# Patient Record
Sex: MALE | Race: WHITE | Employment: OTHER | URBAN - METROPOLITAN AREA
[De-identification: names, ages, dates, MRNs, and addresses within clinical notes are randomized per-mention and may not be internally consistent; named-entity substitution may affect disease eponyms.]

---

## 2023-02-17 PROBLEM — N40.1 BENIGN LOCALIZED PROSTATIC HYPERPLASIA WITH LOWER URINARY TRACT SYMPTOMS (LUTS): Status: ACTIVE | Noted: 2023-02-17

## 2023-02-17 PROBLEM — E78.00 ELEVATED LDL CHOLESTEROL LEVEL: Status: ACTIVE | Noted: 2023-02-17

## 2023-02-17 PROBLEM — E55.9 VITAMIN D INSUFFICIENCY: Status: ACTIVE | Noted: 2023-02-17

## 2023-02-17 PROBLEM — R51.9 HEADACHE: Status: ACTIVE | Noted: 2023-02-17

## 2023-02-17 PROBLEM — N20.0 NEPHROLITHIASIS: Status: ACTIVE | Noted: 2023-02-17

## 2023-02-17 PROBLEM — H93.13 TINNITUS OF BOTH EARS: Status: ACTIVE | Noted: 2023-02-17

## 2023-02-17 PROBLEM — C61 PROSTATE CANCER (MULTI): Status: ACTIVE | Noted: 2023-02-17

## 2023-02-17 RX ORDER — TAMSULOSIN HYDROCHLORIDE 0.4 MG/1
CAPSULE ORAL
COMMUNITY
End: 2023-03-30 | Stop reason: ALTCHOICE

## 2023-02-17 RX ORDER — SILODOSIN 8 MG/1
1 CAPSULE ORAL
COMMUNITY
End: 2024-02-13 | Stop reason: SDUPTHER

## 2023-03-29 NOTE — PROGRESS NOTES
Subjective   Reason for Visit: Sameer Emerson is an 67 y.o. male here for his Subsequent Medicare Assessment          He is considering doing couples counseling.  His wife has a lot of fears. He is very social and is still doing the things he enjoys.  She stays isolated all the time.     He is having issues with seasonal allergies. He is reacting to the pollen  He takes Claritin prn    HEALTH:  PSA was 4.5 in 3/2019, 4/2021 4.76, 3/2022 < 0.10, 1/2023  Colon never and declines   Cologuard normal 10/2022 and Q 3   Ca cardiac score ordered 3/2023  EKG 2009, 3/2022   Urine  Hep B x 3 done  Hep C negative 10/2020  Flu 9/19, 10/2020, 10/2021, 10/2022  TDAP 2015   Prevnar 11/2020  Pneumo 11/2021   Zostavax never   Shingrix recommended and will check local pharmacies   Moderna CVD 1/15/2021 and 2/13/2021 booster   Ophth Sees yearly. He wears reading glasses only. His father had glaucoma.  He has no history of glaucoma or MD.   Copy of his Advanced Directives scanned in his chart 3/2022          Patient Care Team:  Temi Alvarez MD as PCP - [facility]  Temi Alvarez MD as PCP - [employer] PCP     Review of Systems  All systems negative except those listed in the HPI      Objective   Vitals:  Visit Vitals  /58 (BP Location: Left arm, Patient Position: Sitting)   Pulse 58   Temp 36.2 °C (97.1 °F)   Resp 16    Body mass index is 24.82 kg/m².     Physical Exam  Vitals reviewed.   Constitutional:       Appearance: Normal appearance. He is normal weight.   HENT:      Head: Normocephalic.      Right Ear: Tympanic membrane, ear canal and external ear normal.      Left Ear: Tympanic membrane, ear canal and external ear normal.      Ears:      Comments: Debris in ear canals bilaterally      Nose: Nose normal.      Mouth/Throat:      Pharynx: Oropharynx is clear.   Eyes:      Conjunctiva/sclera: Conjunctivae normal.   Cardiovascular:      Rate and Rhythm: Normal rate and regular rhythm.      Pulses: Normal  pulses.      Heart sounds: Normal heart sounds.   Pulmonary:      Effort: Pulmonary effort is normal.      Breath sounds: Normal breath sounds.   Abdominal:      General: Bowel sounds are normal.      Palpations: Abdomen is soft.   Musculoskeletal:         General: Normal range of motion.      Cervical back: Normal range of motion and neck supple.   Skin:     General: Skin is warm.   Neurological:      General: No focal deficit present.      Mental Status: He is alert and oriented to person, place, and time.   Psychiatric:         Mood and Affect: Mood normal.         Behavior: Behavior normal.         Thought Content: Thought content normal.         Judgment: Judgment normal.       Assessment/Plan   Problem List Items Addressed This Visit       Elevated LDL cholesterol level    Relevant Orders    Lipid Panel    TSH with reflex to Free T4 if abnormal    CT cardiac scoring wo IV contrast    Prostate cancer (CMS/HCC) - Primary    Relevant Orders    CBC    Comprehensive Metabolic Panel        Subsequent Medicare Assessment completed   Reviewed his labs from 1/2023  Labs ordered     Medicare Wellness completed  -  Discussed healthy diet and regular exercise.    -  Physical exam overall unremarkable. Immunizations reviewed and updated accordingly. Healthy lifestyle choices discussed (tobacco avoidance, appropriate alcohol use, avoidance of illicit substances).   -  Patient is wearing seatbelt.   -  Screening lab work ordered as indicated.    -  Age appropriate screening tests reviewed with patient.      Recommend he consider getting the Shingrix vaccine - he will check local pharmacies      We spent 15 minutes discussing depression screen and there is nothing found that is of concern for underling depression. The PQH form was filled and the meds reviewed. No depression to report     We spent 15 minutes discussing alcohol use and there are no concerns about overuse. The 15 min was spent in going over any issues of use of  alcohol. No EtOH use    He has grab bars in the shower.  He has not fallen recently and no risk of falls in the house   He has good lighting around the house and functioning smoke detectors.     He has a history of tinnitus.  His has bilateral loss of hearing   He has hearing aids but does not wear them     His weight/ BMI is in normal range in office, recommend he maintain.    BP stable. We will continue to monitor   EKG in 3/24/22 was normal sinus, no LVH or strain pattern noted   Recommend he continue to monitor his BP at home and call with elevated readings   AAA US normal in 4/2022     I have spent 15 min face to face with this patient discussing their cardiac risk and behavioral therapies of nutrition choices and exercise. We are trying to eliminate habits that are contributing to their cardiac risk.  We agreed on a plan of how they can reduce their current CV risk   Since they have CV risk >10 % the advantage of aspirin was discussed and encouraged   The patient's 10 yr CV risk was estimated at 9.2 %. There is no benefit to adding medications at this time 3/2023    Elevated lipids:  and HDL 63 on labs in 3/2022. Labs ordered and we will adjust if indicated  3/2023  Explained goal for LDL to be less than 100 and ideally less than 70   Ca cardiac score ordered today 3/2023    Seasonal allergies:  Recommend using a humidifier in the bedroom at night  Recommend trying OTC Xlear NS BID  Continue Claritin prn and Flonase NS prn allergies. Explained how to use these medications as a prevention     Situational stressors:  He is considering doing couples counseling.  His wife has a lot of fears. He is very social and is still doing the things he enjoys.  She stays isolated all the time. She has a fear of getting long COVID.    He was diagnosed with spontaneous pneumothorax at the age of 19  He had a shingle outbreak approx 20 years ago without recurrence     He has a history of renal stones with lithotripsy in  2009 and good results     Benign localized prostatic hyperplasia with LUTS/ Prostate cancer:  PSA < 0.10  in 1/2023.  He was dx with prostate cancer and underwent radiation and finished the hormone therapy   He saw Blanca Koehler in 9/2022. Continue silodosin 8 mg daily  He saw Heme onc in 12/2022    Vitamin D def:   Continue OTC Vitamin D 5000 UT daily     He has never had a colonoscopy.   Cologuard normal 10/2022 and Q 3     Ophth:  Sees yearly. He wears reading glasses only. His father had glaucoma.  He has no history of glaucoma or MD. He will have her next eye exam faxed to my office in order to update his medical records.     I spent 15 min with the patient discussing their wishes for end of life choices.   We discussed the need for a Living Will and that wishes should be discussed with Family. The DNR status was reviewed, and we discussed the options of this and, the DNR _CC options as well.   We also went over how important it was to have these choices written down and clear for any surviving family so that their wishes are followed   Copy of his Advanced Directives scanned in his chart 3/2022    Hep B x 3 done  Hep C negative 10/2020  Flu 9/19, 10/2020, 10/2021, 10/2022   TDAP 2015   Prevnar 11/2020  Pneumo 11/2021   Zostavax never   Shingrix recommended and will check local pharmacies   Moderna CVD 1/15/2021 and 2/13/2021 booster     RTC in 1 year for MCR or sooner if needed     Scribe Attestation  By signing my name below, ITeri , Scribe   attest that this documentation has been prepared under the direction and in the presence of Temi Alvarez MD.

## 2023-03-30 ENCOUNTER — OFFICE VISIT (OUTPATIENT)
Dept: PRIMARY CARE | Facility: CLINIC | Age: 68
End: 2023-03-30
Payer: MEDICARE

## 2023-03-30 ENCOUNTER — LAB (OUTPATIENT)
Dept: LAB | Facility: LAB | Age: 68
End: 2023-03-30
Payer: MEDICARE

## 2023-03-30 VITALS
RESPIRATION RATE: 16 BRPM | TEMPERATURE: 97.1 F | HEIGHT: 70 IN | HEART RATE: 58 BPM | OXYGEN SATURATION: 98 % | DIASTOLIC BLOOD PRESSURE: 58 MMHG | BODY MASS INDEX: 24.77 KG/M2 | WEIGHT: 173 LBS | SYSTOLIC BLOOD PRESSURE: 100 MMHG

## 2023-03-30 DIAGNOSIS — E78.00 ELEVATED LDL CHOLESTEROL LEVEL: ICD-10-CM

## 2023-03-30 DIAGNOSIS — C61 PROSTATE CANCER (MULTI): ICD-10-CM

## 2023-03-30 DIAGNOSIS — Z00.00 HEALTHCARE MAINTENANCE: Primary | ICD-10-CM

## 2023-03-30 LAB
ALANINE AMINOTRANSFERASE (SGPT) (U/L) IN SER/PLAS: 14 U/L (ref 10–52)
ALBUMIN (G/DL) IN SER/PLAS: 4.1 G/DL (ref 3.4–5)
ALKALINE PHOSPHATASE (U/L) IN SER/PLAS: 85 U/L (ref 33–136)
ANION GAP IN SER/PLAS: 7 MMOL/L (ref 10–20)
ASPARTATE AMINOTRANSFERASE (SGOT) (U/L) IN SER/PLAS: 17 U/L (ref 9–39)
BILIRUBIN TOTAL (MG/DL) IN SER/PLAS: 1.4 MG/DL (ref 0–1.2)
CALCIUM (MG/DL) IN SER/PLAS: 9.1 MG/DL (ref 8.6–10.3)
CARBON DIOXIDE, TOTAL (MMOL/L) IN SER/PLAS: 31 MMOL/L (ref 21–32)
CHLORIDE (MMOL/L) IN SER/PLAS: 103 MMOL/L (ref 98–107)
CHOLESTEROL (MG/DL) IN SER/PLAS: 180 MG/DL (ref 0–199)
CHOLESTEROL IN HDL (MG/DL) IN SER/PLAS: 47.5 MG/DL
CHOLESTEROL/HDL RATIO: 3.8
CREATININE (MG/DL) IN SER/PLAS: 1.02 MG/DL (ref 0.5–1.3)
ERYTHROCYTE DISTRIBUTION WIDTH (RATIO) BY AUTOMATED COUNT: 12.4 % (ref 11.5–14.5)
ERYTHROCYTE MEAN CORPUSCULAR HEMOGLOBIN CONCENTRATION (G/DL) BY AUTOMATED: 32 G/DL (ref 32–36)
ERYTHROCYTE MEAN CORPUSCULAR VOLUME (FL) BY AUTOMATED COUNT: 92 FL (ref 80–100)
ERYTHROCYTES (10*6/UL) IN BLOOD BY AUTOMATED COUNT: 5.12 X10E12/L (ref 4.5–5.9)
GFR MALE: 80 ML/MIN/1.73M2
GLUCOSE (MG/DL) IN SER/PLAS: 93 MG/DL (ref 74–99)
HEMATOCRIT (%) IN BLOOD BY AUTOMATED COUNT: 47.2 % (ref 41–52)
HEMOGLOBIN (G/DL) IN BLOOD: 15.1 G/DL (ref 13.5–17.5)
LDL: 117 MG/DL (ref 0–99)
LEUKOCYTES (10*3/UL) IN BLOOD BY AUTOMATED COUNT: 6.8 X10E9/L (ref 4.4–11.3)
NRBC (PER 100 WBCS) BY AUTOMATED COUNT: 0 /100 WBC (ref 0–0)
PLATELETS (10*3/UL) IN BLOOD AUTOMATED COUNT: 151 X10E9/L (ref 150–450)
POTASSIUM (MMOL/L) IN SER/PLAS: 4.3 MMOL/L (ref 3.5–5.3)
PROTEIN TOTAL: 6.6 G/DL (ref 6.4–8.2)
SODIUM (MMOL/L) IN SER/PLAS: 137 MMOL/L (ref 136–145)
THYROTROPIN (MIU/L) IN SER/PLAS BY DETECTION LIMIT <= 0.05 MIU/L: 1.53 MIU/L (ref 0.44–3.98)
TRIGLYCERIDE (MG/DL) IN SER/PLAS: 76 MG/DL (ref 0–149)
UREA NITROGEN (MG/DL) IN SER/PLAS: 15 MG/DL (ref 6–23)
VLDL: 15 MG/DL (ref 0–40)

## 2023-03-30 PROCEDURE — 80061 LIPID PANEL: CPT

## 2023-03-30 PROCEDURE — 80053 COMPREHEN METABOLIC PANEL: CPT

## 2023-03-30 PROCEDURE — 1170F FXNL STATUS ASSESSED: CPT | Performed by: INTERNAL MEDICINE

## 2023-03-30 PROCEDURE — 85027 COMPLETE CBC AUTOMATED: CPT

## 2023-03-30 PROCEDURE — 36415 COLL VENOUS BLD VENIPUNCTURE: CPT

## 2023-03-30 PROCEDURE — 99213 OFFICE O/P EST LOW 20 MIN: CPT | Performed by: INTERNAL MEDICINE

## 2023-03-30 PROCEDURE — G0442 ANNUAL ALCOHOL SCREEN 15 MIN: HCPCS | Performed by: INTERNAL MEDICINE

## 2023-03-30 PROCEDURE — 1159F MED LIST DOCD IN RCRD: CPT | Performed by: INTERNAL MEDICINE

## 2023-03-30 PROCEDURE — G0444 DEPRESSION SCREEN ANNUAL: HCPCS | Performed by: INTERNAL MEDICINE

## 2023-03-30 PROCEDURE — 1160F RVW MEDS BY RX/DR IN RCRD: CPT | Performed by: INTERNAL MEDICINE

## 2023-03-30 PROCEDURE — G0446 INTENS BEHAVE THER CARDIO DX: HCPCS | Performed by: INTERNAL MEDICINE

## 2023-03-30 PROCEDURE — 1036F TOBACCO NON-USER: CPT | Performed by: INTERNAL MEDICINE

## 2023-03-30 PROCEDURE — 84443 ASSAY THYROID STIM HORMONE: CPT

## 2023-03-30 PROCEDURE — G0439 PPPS, SUBSEQ VISIT: HCPCS | Performed by: INTERNAL MEDICINE

## 2023-03-30 ASSESSMENT — COLUMBIA-SUICIDE SEVERITY RATING SCALE - C-SSRS
1. IN THE PAST MONTH, HAVE YOU WISHED YOU WERE DEAD OR WISHED YOU COULD GO TO SLEEP AND NOT WAKE UP?: NO
6. HAVE YOU EVER DONE ANYTHING, STARTED TO DO ANYTHING, OR PREPARED TO DO ANYTHING TO END YOUR LIFE?: NO
2. HAVE YOU ACTUALLY HAD ANY THOUGHTS OF KILLING YOURSELF?: NO

## 2023-03-30 ASSESSMENT — ACTIVITIES OF DAILY LIVING (ADL)
MANAGING_FINANCES: INDEPENDENT
FEEDING: INDEPENDENT
TOILETING: INDEPENDENT
NEEDS ASSISTANCE WITH FOOD: INDEPENDENT
GROCERY SHOPPING: INDEPENDENT
DOING HOUSEWORK: INDEPENDENT
DOING_HOUSEWORK: INDEPENDENT
USING TELEPHONE: INDEPENDENT
BATHING: INDEPENDENT
BATHING: INDEPENDENT
DRESSING: INDEPENDENT
USING TRANSPORTATION: INDEPENDENT
TAKING_MEDICATION: INDEPENDENT
JUDGMENT_ADEQUATE_SAFELY_COMPLETE_DAILY_ACTIVITIES: YES
STIL DRIVING: YES
DRESSING: INDEPENDENT
DOING_HOUSEWORK: INDEPENDENT
PILL BOX USED: NO
TAKING MEDICATION: INDEPENDENT
ADEQUATE_TO_COMPLETE_ADL: YES
GROCERY_SHOPPING: INDEPENDENT
GROCERY_SHOPPING: INDEPENDENT
PREPARING MEALS: INDEPENDENT
TAKING_MEDICATION: INDEPENDENT
EATING: INDEPENDENT
DRESSING: INDEPENDENT
MANAGING FINANCES: INDEPENDENT
BATHING: INDEPENDENT
MANAGING_FINANCES: INDEPENDENT

## 2023-03-30 ASSESSMENT — PATIENT HEALTH QUESTIONNAIRE - PHQ9
SUM OF ALL RESPONSES TO PHQ9 QUESTIONS 1 AND 2: 0
SUM OF ALL RESPONSES TO PHQ9 QUESTIONS 1 AND 2: 0
1. LITTLE INTEREST OR PLEASURE IN DOING THINGS: NOT AT ALL
SUM OF ALL RESPONSES TO PHQ9 QUESTIONS 1 AND 2: 0
1. LITTLE INTEREST OR PLEASURE IN DOING THINGS: NOT AT ALL
2. FEELING DOWN, DEPRESSED OR HOPELESS: NOT AT ALL
1. LITTLE INTEREST OR PLEASURE IN DOING THINGS: NOT AT ALL
1. LITTLE INTEREST OR PLEASURE IN DOING THINGS: NOT AT ALL
SUM OF ALL RESPONSES TO PHQ9 QUESTIONS 1 AND 2: 0

## 2023-03-30 ASSESSMENT — ENCOUNTER SYMPTOMS
LOSS OF SENSATION IN FEET: 0
DEPRESSION: 0
OCCASIONAL FEELINGS OF UNSTEADINESS: 0

## 2023-03-30 ASSESSMENT — PAIN SCALES - GENERAL: PAINLEVEL: 0-NO PAIN

## 2023-03-30 NOTE — RESULT ENCOUNTER NOTE
The lipids are a little elevated and the cardiac risk is 10 % for a cardiac event in 10 yrs so if we get the LDL <100 the cardiac risk will drop een more   The rest of the labs are good range

## 2023-03-30 NOTE — PATIENT INSTRUCTIONS
It was a pleasure to see you today.  I would like to remind you about importance of a healthy lifestyle in order to improve your well-being and live longer. Try to engage in physical activities for at least 150 minutes per week.  Eat about 10 servings of fruits and vegetables daily. My advice is 2 servings of fruits and 8 servings of vegetables. For vegetables choose at least half of them green and at least half of them fresh.  Please avoid sugar, salt, fried food and saturated fat.  Please follow low carbohydrate diet and daily exercise routine for at least 30 minutes. Nutritional consultation is available, please let me know if you are interested. I will be happy to discuss details with you if interested.   Have a good day and stay well.

## 2023-06-08 LAB — PROSTATE SPECIFIC AG (NG/ML) IN SER/PLAS: <0.1 NG/ML (ref 0–4)

## 2023-06-30 DIAGNOSIS — R91.1 LUNG NODULE, SOLITARY: Primary | ICD-10-CM

## 2023-08-11 DIAGNOSIS — R69 TRAVEL-RELATED ILLNESS: Primary | ICD-10-CM

## 2023-08-12 DIAGNOSIS — R91.8 LUNG NODULES: Primary | ICD-10-CM

## 2023-08-17 DIAGNOSIS — R42 VERTIGO: Primary | ICD-10-CM

## 2023-08-17 RX ORDER — MECLIZINE HYDROCHLORIDE 25 MG/1
25 TABLET ORAL 3 TIMES DAILY PRN
Qty: 30 TABLET | Refills: 1 | Status: SHIPPED | OUTPATIENT
Start: 2023-08-17 | End: 2024-03-17 | Stop reason: ALTCHOICE

## 2023-10-18 ENCOUNTER — CLINICAL SUPPORT (OUTPATIENT)
Dept: PRIMARY CARE | Facility: CLINIC | Age: 68
End: 2023-10-18
Payer: MEDICARE

## 2023-10-18 PROCEDURE — 90662 IIV NO PRSV INCREASED AG IM: CPT | Performed by: INTERNAL MEDICINE

## 2023-10-18 PROCEDURE — G0008 ADMIN INFLUENZA VIRUS VAC: HCPCS | Performed by: INTERNAL MEDICINE

## 2023-10-18 NOTE — PROGRESS NOTES
Patient presents in the office for Flu vaccine.     This drug was administered by Adela Olivares MA at 9:48 AM per physician order.    Patient tolerated well.

## 2023-12-04 ENCOUNTER — TELEPHONE (OUTPATIENT)
Dept: GASTROENTEROLOGY | Facility: HOSPITAL | Age: 68
End: 2023-12-04
Payer: MEDICARE

## 2023-12-12 ENCOUNTER — LAB (OUTPATIENT)
Dept: LAB | Facility: LAB | Age: 68
End: 2023-12-12
Payer: MEDICARE

## 2023-12-12 DIAGNOSIS — C61 MALIGNANT NEOPLASM OF PROSTATE (MULTI): Primary | ICD-10-CM

## 2023-12-12 LAB — PSA SERPL-MCNC: <0.1 NG/ML

## 2023-12-12 PROCEDURE — 84153 ASSAY OF PSA TOTAL: CPT

## 2023-12-12 PROCEDURE — 36415 COLL VENOUS BLD VENIPUNCTURE: CPT

## 2023-12-21 ENCOUNTER — TELEPHONE (OUTPATIENT)
Dept: RADIATION ONCOLOGY | Facility: HOSPITAL | Age: 68
End: 2023-12-21
Payer: MEDICARE

## 2023-12-21 NOTE — PROGRESS NOTES
Cancer synopsis:  Rad/onc: Dr. Guy/ Percy CNP       Interval History:    Referred by Dr. Sameer Caceres.     He had an elevated PSA at 4.76 on 1/9/21, which increased to 5.8 on 6/27/21.      Prostate MRI on 8/7/21 showed:  1.  1.5 cm right posteromedial peripheral zone lesion extending from the base to the mid gland and 1.1 cm left posteromedial peripheral zone lesion in the mid gland the consistent with  PI-RADS 5 and 4  lesions respectively. Mild capsular irregularity associated with both these lesions concerning for extracapsular extension. Abutment of the medial aspect of the right seminal vesicle by the right base  peripheral zone lesion concerning for seminal vesicle invasion.  2. No pelvic lymphadenopathy.     The prostate was estimated to weigh 24.2g.     Prostate biopsy on 8/26/21 showed:  A.  PROSTATE, RIGHT APEX, BIOPSY:    --ADENOCARCINOMA, BRIANNE SCORE 3+4=7 (GRADE GROUP 2), INVOLVING TWO OF TWO  CORES AND 70% OF THE SUBMITTED TISSUE     B.  PROSTATE, RIGHT MID, BIOPSY:    --ADENOCARCINOMA, BRIANNE SCORE 4+3=7 (GRADE GROUP 3), INVOLVING TWO OF TWO  CORES AND 95% OF THE SUBMITTED TISSUE; SEE NOTE.     C.  PROSTATE, RIGHT BASE, BIOPSY:    --ADENOCARCINOMA, BRIANNE SCORE 3+3=6 (GRADE GROUP 1), INVOLVING ONE OF TWO  CORES AND LESS THAN 5% OF THE SUBMITTED TISSUE; SEE NOTE.     D.  PROSTATE, LEFT APEX, BIOPSY:    --ADENOCARCINOMA, BRIANNE SCORE 3+3=6 (GRADE GROUP 1), INVOLVING TWO OF TWO  CORES AND 5% OF THE SUBMITTED TISSUE     E.  PROSTATE, LEFT MID, BIOPSY:    --ADENOCARCINOMA, BRIANNE SCORE 3+3=6 (GRADE GROUP 1), INVOLVING TWO OF TWO  CORES AND 20% OF THE SUBMITTED TISSUE     F.  PROSTATE, LEFT BASE, BIOPSY:    --ADENOCARCINOMA, BRIANNE SCORE 3+3=6 (GRADE GROUP 1), INVOLVING TWO OF TWO  CORES AND 20% OF THE SUBMITTED TISSUE        SBRT completed on 12/17/21.     Completed course of Orgovyx.       History of presenting illness:    Patient ID: [id number]     Sameer Emerson is a 68 y.o. male who presents  unfavorable  intermediate risk prostate cancer, [id number], Luly 3+4=7 (12/12 involved cores), iPSA 5.8 now s/p RT 12/17/2021    RT Site: Prostate   RT Date: 12/08/2021  Hormone therapy: Yes, st-term orgovyx with testerone recovery  Hot Flushes: Denies  Fatigue: Denies  Bone pain: Denies  FLYNN: n/a virtual  ED: Does report some changes in sexual function. Has not used PDE5i in the past.  ED medications: No  IPSS: n/a virtual  Urinary symptoms: Does reports hematuria, frequency or urgency, urine leakage   Urinary Medications: Yes, silodosin 8 mg, Cialis 5 mg and mybrtriq 50mg  Rectal bleeding: Reports rectal bleeding about once a week. Sometimes can be 2-3 days a in a row. Recently had hemmoird banding. Is to have GI appointment in coming month for colonoscopy.  Other systems: Denies SOB, CP or fever. Does report some muscle loss and weight gain since hormone therapy.      Review of systems:  Review of Systems   Constitutional:  Negative for fatigue, fever and unexpected weight change.   Respiratory:  Negative for cough, chest tightness and shortness of breath.    Cardiovascular:  Negative for chest pain, palpitations and leg swelling.   Gastrointestinal:  Positive for anal bleeding and blood in stool. Negative for abdominal pain, constipation, diarrhea and rectal pain.        Refer to HPI   Endocrine: Negative for cold intolerance, heat intolerance and polyuria.   Genitourinary:  Negative for decreased urine volume, difficulty urinating, dysuria, frequency, hematuria and urgency.        Refer to HPI   Musculoskeletal:  Negative for arthralgias, back pain, gait problem and joint swelling.   Skin: Negative.    Allergic/Immunologic: Negative.    Neurological:  Negative for dizziness, syncope and weakness.   Psychiatric/Behavioral: Negative.         Past Medical history  No past medical history on file.     Surgical/family history  Family History   Problem Relation Name Age of Onset    Alzheimer's disease Mother       Glaucoma Father      Pneumonia Father        No past surgical history on file.     Social History  Tobacco Use: Low Risk  (3/30/2023)    Patient History     Smoking Tobacco Use: Never     Smokeless Tobacco Use: Never     Passive Exposure: Not on file         Current med list:  Current Outpatient Medications   Medication Instructions    meclizine (ANTIVERT) 25 mg, oral, 3 times daily PRN    silodosin (Rapaflo) 8 mg capsule 1 capsule, Daily with breakfast        Last recorded vital:  There were no vitals taken for this visit.    Physical exam  N/a virtual    Pertinent labs:  Prostate Specific AG   Date/Time Value Ref Range Status   12/12/2023 12:03 PM <0.10 <=4.00 ng/mL Final         Dx:  Problem List Items Addressed This Visit    None   PSA of <0.10 was reviewed and is undectable. Review of latent SE including rectal bleeding, hematuria, urinary strictures, ED where reviewed as well as how to contact office if s/s present. Does report occasional rectal bleeding.  Reports some mild decline in erectile function. Denies other latent SE.     Discussed ongoing rectal bleeding and the onset of radiation proctitis. Reviewed treatment measures including conservative monitoring, rectal enemas including proctofoam and sucralfate as well as in refractory cases the usage of homian laser or HBO. At this time patient is going to have a colonoscopy performed and killian let rad/onc know when done. Will assess at this time for enema need or not.       Reviewed ED as a multi facet issue consisting of psychological, Testerone, nervous and vascular related issues. Discussed RT effecting nervous system can result in changed erectile function combined with age. Discussed use of PDE5i including Viagra and Cialis and mechanism of action being dilation of blood vessels. Reviewed risk associated with usage including stroke, MI, fainting and other serious adverse events. Denies hx of either stroke or MI. Passing princeton II criteria a this time.  Reviewed dosing and usage of cialis 10mg. Never to exceed dose of 20mg.  Will reassess at next visit. If unresolved will adjust dosage or refer to ED specialist. Patient verbalized understanding.    PLAN:  FUV 6m virtually  Labs Psa in 6m  Imaging none  Silodosin daily  FUV other providers: PCP for routine evals        Please contact office with any concerns:  Domo Fink CNP  [phone]

## 2023-12-22 ENCOUNTER — HOSPITAL ENCOUNTER (OUTPATIENT)
Dept: RADIATION ONCOLOGY | Facility: HOSPITAL | Age: 68
Setting detail: RADIATION/ONCOLOGY SERIES
Discharge: HOME | End: 2023-12-22
Payer: MEDICARE

## 2023-12-22 DIAGNOSIS — C61 MALIGNANT NEOPLASM OF PROSTATE (MULTI): Primary | ICD-10-CM

## 2023-12-22 PROCEDURE — 99214 OFFICE O/P EST MOD 30 MIN: CPT

## 2023-12-22 ASSESSMENT — ENCOUNTER SYMPTOMS
PSYCHIATRIC NEGATIVE: 1
BACK PAIN: 0
DYSURIA: 0
CHEST TIGHTNESS: 0
UNEXPECTED WEIGHT CHANGE: 0
DIARRHEA: 0
DIFFICULTY URINATING: 0
FREQUENCY: 0
ANAL BLEEDING: 1
BLOOD IN STOOL: 1
JOINT SWELLING: 0
ABDOMINAL PAIN: 0
CONSTIPATION: 0
PALPITATIONS: 0
COUGH: 0
FEVER: 0
FATIGUE: 0
DIZZINESS: 0
ALLERGIC/IMMUNOLOGIC NEGATIVE: 1
HEMATURIA: 0
SHORTNESS OF BREATH: 0
RECTAL PAIN: 0
ROS GI COMMENTS: REFER TO HPI
ARTHRALGIAS: 0
WEAKNESS: 0

## 2024-01-31 DIAGNOSIS — K62.5 RECTAL BLEEDING: ICD-10-CM

## 2024-01-31 DIAGNOSIS — C61 MALIGNANT NEOPLASM OF PROSTATE (MULTI): Primary | ICD-10-CM

## 2024-01-31 RX ORDER — MESALAMINE 1000 MG/1
500 SUPPOSITORY RECTAL 2 TIMES DAILY
Qty: 14 SUPPOSITORY | Refills: 1 | Status: SHIPPED | OUTPATIENT
Start: 2024-01-31 | End: 2024-02-01 | Stop reason: SDUPTHER

## 2024-02-01 DIAGNOSIS — K62.5 RECTAL BLEEDING: ICD-10-CM

## 2024-02-01 RX ORDER — MESALAMINE 1000 MG/1
500 SUPPOSITORY RECTAL 2 TIMES DAILY
Qty: 14 SUPPOSITORY | Refills: 1 | Status: SHIPPED | OUTPATIENT
Start: 2024-02-01 | End: 2024-03-17 | Stop reason: ALTCHOICE

## 2024-02-13 DIAGNOSIS — R39.12 WEAK URINARY STREAM: Primary | ICD-10-CM

## 2024-02-13 RX ORDER — SILODOSIN 8 MG/1
8 CAPSULE ORAL
Qty: 30 CAPSULE | Refills: 5 | Status: SHIPPED | OUTPATIENT
Start: 2024-02-13 | End: 2024-08-11

## 2024-02-14 ENCOUNTER — OFFICE VISIT (OUTPATIENT)
Dept: GASTROENTEROLOGY | Facility: CLINIC | Age: 69
End: 2024-02-14
Payer: MEDICARE

## 2024-02-14 VITALS
SYSTOLIC BLOOD PRESSURE: 131 MMHG | DIASTOLIC BLOOD PRESSURE: 67 MMHG | HEART RATE: 70 BPM | BODY MASS INDEX: 24.98 KG/M2 | HEIGHT: 70 IN | TEMPERATURE: 98.1 F | WEIGHT: 174.5 LBS

## 2024-02-14 DIAGNOSIS — K62.7 RADIATION PROCTITIS: Primary | ICD-10-CM

## 2024-02-14 PROCEDURE — 99203 OFFICE O/P NEW LOW 30 MIN: CPT | Performed by: INTERNAL MEDICINE

## 2024-02-14 PROCEDURE — 99213 OFFICE O/P EST LOW 20 MIN: CPT | Performed by: INTERNAL MEDICINE

## 2024-02-14 PROCEDURE — 1036F TOBACCO NON-USER: CPT | Performed by: INTERNAL MEDICINE

## 2024-02-14 PROCEDURE — 1159F MED LIST DOCD IN RCRD: CPT | Performed by: INTERNAL MEDICINE

## 2024-02-14 PROCEDURE — 1126F AMNT PAIN NOTED NONE PRSNT: CPT | Performed by: INTERNAL MEDICINE

## 2024-02-14 RX ORDER — MIRABEGRON 50 MG/1
1 TABLET, FILM COATED, EXTENDED RELEASE ORAL DAILY
COMMUNITY
Start: 2023-06-29

## 2024-02-14 RX ORDER — SILDENAFIL CITRATE 20 MG/1
TABLET ORAL
COMMUNITY
Start: 2021-11-10 | End: 2024-03-17 | Stop reason: ALTCHOICE

## 2024-02-14 ASSESSMENT — PAIN SCALES - GENERAL: PAINLEVEL: 0-NO PAIN

## 2024-02-14 NOTE — PROGRESS NOTES
"History Of Present Illness  Sameer Emerson is a 68 y.o. male presenting with rectal bleeding.    He has a history of prostate cancer treated with radiation therapy (SBRT) in 2021.  He did well until sometime last year when he began to notice episodic hematochezia as well as perianal pain and a feeling of something protruding.  He was seen by another physician who performed hemorrhoidal banding and the perianal pain and protrusion resolved.  However he still has intermittent hematochezia typically 3-4 times per week with bright red blood.  Bowel movements themselves are normal and he eats a high-fiber diet.  He has had no abdominal pain or weight loss.  He had a negative Cologuard about 3 years ago and has never had colonoscopy.  There is no family history of colon cancer.     Past Medical History  No past medical history on file.    Surgical History  No past surgical history on file.     Social History  He reports that he has never smoked. He has never used smokeless tobacco. He reports that he does not currently use alcohol. He reports that he does not use drugs.    Family History  Family History   Problem Relation Name Age of Onset    Alzheimer's disease Mother      Glaucoma Father      Pneumonia Father          Allergies  Patient has no known allergies.    Last Recorded Vitals  /67   Pulse 70   Temp 36.7 °C (98.1 °F) (Temporal)   Ht 1.778 m (5' 10\")   Wt 79.2 kg (174 lb 8 oz)   BMI 25.04 kg/m²        Physical Exam  Constitutional:       Appearance: Normal appearance.   Cardiovascular:      Rate and Rhythm: Normal rate and regular rhythm.   Pulmonary:      Breath sounds: Normal breath sounds.   Abdominal:      General: Bowel sounds are normal.      Palpations: Abdomen is soft. There is no mass.      Tenderness: There is no abdominal tenderness.   Neurological:      Mental Status: He is alert.           Labs  Lab Results   Component Value Date    GLUCOSE 93 03/30/2023    CALCIUM 9.1 03/30/2023    NA " 137 03/30/2023    K 4.3 03/30/2023    CO2 31 03/30/2023     03/30/2023    BUN 15 03/30/2023    CREATININE 1.02 03/30/2023     Lab Results   Component Value Date    WBC 6.8 03/30/2023    HGB 15.1 03/30/2023    HCT 47.2 03/30/2023    MCV 92 03/30/2023     03/30/2023     03/30/2023    K 4.3 03/30/2023     03/30/2023    CO2 31 03/30/2023    BUN 15 03/30/2023    CREATININE 1.02 03/30/2023    CALCIUM 9.1 03/30/2023    PROT 6.6 03/30/2023    BILITOT 1.4 (H) 03/30/2023    ALKPHOS 85 03/30/2023    ALT 14 03/30/2023    AST 17 03/30/2023    GLUCOSE 93 03/30/2023           Imaging          ASSESSMENT & PLAN  Problem List Items Addressed This Visit    None  Visit Diagnoses         Codes    Radiation proctitis    -  Primary K62.7    Relevant Orders    Colonoscopy Screening; Average Risk Patient          The clinical picture is most consistent with a radiation proctitis and we discussed the role of endoscopic intervention with APC.  As he has never had colonoscopy I would recommend that he undergo a screening examination for polyps as well.  For logistical reasons it would be easier for him to have it done on the [place] and we will try to set him up at [facility].    I spent 25 minutes in the professional and overall care of this patient.      Osmin Caro MD

## 2024-02-14 NOTE — PATIENT INSTRUCTIONS
You most likely have radiation proctitis to account for the bleeding and would recommend therapy with APC if confirmed.  Since you've never had a full colonoscopy we would recommend that to check for polyps. I can schedule at [facility] if that is more convenient. You can stay on the mesalamine if you think it is helping.

## 2024-03-17 DIAGNOSIS — E78.00 ELEVATED LDL CHOLESTEROL LEVEL: Primary | ICD-10-CM

## 2024-03-20 NOTE — PROGRESS NOTES
"Subjective   Reason for Visit: Sameer Emerson is an 68 y.o. male here for his Subsequent Medicare Assessment, annual physical and follow up       He is fatigued   He has lost a lot of muscle mass since treatment for prostate cancer     The proctitis is improving   He is no longer using the suppositories from the bleeding  He is going to do the colonoscopy ordered by Dr Caro in 2/2024     He has always eaten well.     He continues to have tinnitus     He is not sleeping well. He has \"weird\" dreams at night and thinks it is due to his medications        HEALTH:  PSA  4.5 in 3/2019, 4/2021 4.76, 3/2022, 1/2023, 12/2023   Colon never and declines   Cologuard normal 10/2022 and Q 3   Ca cardiac score 6/2023 3/2023 score was 0.6. Within limitation from respiratory motion, there is suspected 6-7 mm solid pulmonary nodule within left lower lobe. In absence of prior cross-sectional imaging of chest,    EKG 2009, 3/2022, 3/2024  Urine  Hep B x 3 done  Hep C negative 10/2020  Flu 9/19, 10/2020, 10/2021, 10/2022, 10/2023  TDAP 2015, will update with injury   Prevnar 11/2020  Pneumo 11/2021   Zostavax never   Shingrix recommended and will check local pharmacies   Moderna CVD 1/2021 and 2/2021 booster 10/21, 5/22, 8/23, 1/24  Ophth Sees yearly. He wears reading glasses only. His father had glaucoma.  He has no history of glaucoma or MD.   Copy of his Advanced Directives scanned in his chart 3/2022      Patient Care Team:  Temi Alvarez MD as PCP - General  Temi Alvarez MD as PCP - [employer] PCP     Review of Systems  All systems negative except those listed in the HPI      Past Medical, Surgical, and Family History reviewed and updated in chart.  Reviewed all medications by prescribing practitioner or clinical pharmacist   (such as prescriptions, OTCs, herbal therapies and supplements) and documented in the medical record       Objective   Vitals:  Visit Vitals  /60 (BP Location: Left arm, " Patient Position: Sitting)   Pulse 69   Temp 36.2 °C (97.1 °F) (Temporal)    Body mass index is 24.61 kg/m².      Physical Exam  Vitals reviewed.   Constitutional:       Appearance: Normal appearance. He is normal weight.   HENT:      Head: Normocephalic.      Right Ear: Tympanic membrane, ear canal and external ear normal.      Left Ear: Tympanic membrane, ear canal and external ear normal.      Nose: Nose normal.      Mouth/Throat:      Pharynx: Oropharynx is clear.   Eyes:      Conjunctiva/sclera: Conjunctivae normal.   Cardiovascular:      Rate and Rhythm: Normal rate and regular rhythm.      Pulses: Normal pulses.      Heart sounds: Normal heart sounds.      Comments: Mild LLE edema   Pulmonary:      Effort: Pulmonary effort is normal.      Breath sounds: Normal breath sounds.   Abdominal:      General: Bowel sounds are normal.      Palpations: Abdomen is soft.   Musculoskeletal:         General: Normal range of motion.      Cervical back: Normal range of motion and neck supple.   Skin:     General: Skin is warm.   Neurological:      General: No focal deficit present.      Mental Status: He is alert and oriented to person, place, and time.   Psychiatric:         Mood and Affect: Mood normal.         Behavior: Behavior normal.         Thought Content: Thought content normal.         Judgment: Judgment normal.       Assessment/Plan   Problem List Items Addressed This Visit    None    Subsequent Medicare Assessment, annual physical and follow up completed   Labs ordered      Medicare Wellness completed  -  Discussed healthy diet and regular exercise.    -  Physical exam overall unremarkable. Immunizations reviewed and updated accordingly. Healthy lifestyle choices discussed (tobacco avoidance, appropriate alcohol use, avoidance of illicit substances).   -  Patient is wearing seatbelt.   -  Screening lab work ordered as indicated.    -  Age appropriate screening tests reviewed with patient.      Recommend he  "consider getting the Shingrix vaccine - he will check local pharmacies       We spent 15 minutes discussing depression screen and there is nothing found that is of concern for underling depression. The PQH form was filled and the meds reviewed. No depression to report      --> We spent 15 minutes discussing alcohol use and there are no concerns about overuse. The 15 min was spent in going over any issues of use of alcohol.   No EtOH      He has grab bars in the shower.  He has not fallen recently and no risk of falls in the house   He has good lighting around the house and functioning smoke detectors.     He is  with 2 children. He denies previous history of tobacco use      Fatigue and insomnia: Explained systemic medications contributing to fatigue 3/24  He has lost a lot of muscle mass since treatment for prostate cancer   Recommend he ask urology about testosterone gel   He is not sleeping well.   He has \"weird\" dreams at night and thinks it is due to his medications    Discussed systemic medications to help with sleep along with possible side effects  He will consider adding Remeron to help with sleep and Wellbutrin to help with energy.   Recommend he try OTC Melatonin 5 mcg nightly      He has a history of tinnitus.  His has bilateral loss of hearing   He has hearing aids but does not wear them      His weight/ BMI is in normal range in office, recommend he maintain.  His weight is 174 pounds with BMI at 24.61 IO 3/2024   He has lost a lot of muscle mass since treatment for prostate cancer    Discussed importance of protein intake.      BP stable. We will continue to monitor. He admits his HR is elevated due to being somewhat dehydrated. He will increase water intake   EKG in 3/2024 normal sinus, HR 58, no LVH or strain pattern noted   AAA US normal in 4/2022   Continue to monitor BP at home and call with elevated readings       We discussed the patients cardiovascular risk. If needed, lifestyle " modifications recommended including: behavioral therapies of nutrition choices, exercise and eliminate habits that are contributing to their cardiac risk. We agreed to a plan to decrease his cardiovascular risks. Discussed ASA. Reviewed Guidelines and approved recommendations made to patient.   The patient's 10 yr CV risk was estimated at  15.6% IO 3/2024. We can decrease his risk if we get tighter control of his BP 3/2024.       Elevated lipids: Labs ordered and we will adjust if indicated  3/2024  Explained goal for LDL to be less than 100 and ideally less than 70   Explained goal for HDL to be between 55- 60   Ca cardiac score 6/2023 score was 0.6. Within limitation from respiratory motion, there is suspected 6-7 mm solid pulmonary nodule within left lower lobe. In absence of prior cross-sectional imaging of chest  CT chest 8/2023 Unchanged 5 mm subpleural nodule in the left lower lobe compared to very recent examination on 06/28/2023. Additional solid nodules   are noted throughout the lungs measuring up to 5 mm. These nodules   are nonspecific, but favored to be benign in nature. However, given   history of malignancy.  CT ordered 3/2024   Recommend he look into a plant based/ whole foods diet      Seasonal allergies:  Recommend using a humidifier in the bedroom at night  Recommend trying OTC Xlear NS BID  Continue Claritin prn and Flonase NS prn allergies. Explained how to use these medications as a prevention      Situational stressors:  He is considering doing couples counseling.  His wife has a lot of fears. He is very social and is still doing the things he enjoys.     Hx of renal stones with lithotripsy in 2009 and no recurrence      Benign localized prostatic hyperplasia with LUTS/ Prostate cancer: PSA < 0.10 on labs in 12/2023.   Treated with RTX therapy (SBRT) in 2021  Dx with prostate cancer. He underwent radiation and finished the hormone therapy   He saw Blanca Koehler in 9/2022. Continue silodosin 8  mg daily  He saw Dr Cantor in 6/2023 (urology) and Rx given for Myrbetriq   He saw Dr Caro in 2/2024 and per notes: The clinical picture is most consistent with a radiation proctitis and we discussed the role of endoscopic intervention with APC. As he has never had colonoscopy I would recommend that he undergo a screening examination for polyps as well.      Vitamin D def:   Continue OTC Vitamin D 5000 UT daily      He has never had a colonoscopy. Orders placed by Dr Caro for colon 2/2024  Cologuard normal 10/2022 and Q 3      Ophth:  Sees yearly. He wears reading glasses only. His father had glaucoma.  He has no history of glaucoma or MD. He will have her next eye exam faxed to my office in order to update his medical records.      I spent 15 min with the patient discussing their wishes for end of life choices. We discussed the need for a Living Will and that wishes should be discussed with Family. The DNR status was reviewed, and we discussed the options of this and, the DNR _CC options as well.   We also went over how important it was to have these choices written down and clear for any surviving family so that their wishes are followed   Copy of his Advanced Directives scanned in his chart 3/2022     Hep B x 3 done  Hep C negative 10/2020  Flu 9/19, 10/2020, 10/2021, 10/2022, 10/2023  TDAP 2015, will update with injury   Prevnar 11/2020  Pneumo 11/2021   Zostavax never   Shingrix recommended and will check local pharmacies   Moderna CVD 1/2021 and 2/2021 booster 10/21, 5/22, 8/23, 1/24     Some elements in the chart were copied from Dr. Alvarez's last office visit with patient. Notes have been updated where appropriate, and reflect my current medical decision making from today.        RTC in 1 year for MCR or sooner if needed   (MCR/CPE due 3/2025)      Scribe Attestation  By signing my name below, Teri YOUNG , Scribe   attest that this documentation has been prepared under the direction and in the presence  [facility] CHRISTIANE Alvarez MD.

## 2024-03-20 NOTE — PROGRESS NOTES
Subjective   Patient ID: Sameer Emerson is a 68 y.o. male who presents for No chief complaint on file..    HPI     Review of Systems    Objective   There were no vitals taken for this visit.    Physical Exam    Assessment/Plan   {Assess/PlanSmartLinks:08472}

## 2024-03-21 ENCOUNTER — OFFICE VISIT (OUTPATIENT)
Dept: PRIMARY CARE | Facility: CLINIC | Age: 69
End: 2024-03-21
Payer: MEDICARE

## 2024-03-21 VITALS
DIASTOLIC BLOOD PRESSURE: 60 MMHG | BODY MASS INDEX: 24.36 KG/M2 | OXYGEN SATURATION: 98 % | WEIGHT: 174 LBS | TEMPERATURE: 97.1 F | HEART RATE: 69 BPM | SYSTOLIC BLOOD PRESSURE: 124 MMHG | HEIGHT: 71 IN

## 2024-03-21 DIAGNOSIS — Z00.00 ROUTINE GENERAL MEDICAL EXAMINATION AT HEALTH CARE FACILITY: ICD-10-CM

## 2024-03-21 DIAGNOSIS — C61 MALIGNANT NEOPLASM OF PROSTATE (MULTI): ICD-10-CM

## 2024-03-21 DIAGNOSIS — R53.0 NEOPLASTIC MALIGNANT RELATED FATIGUE: ICD-10-CM

## 2024-03-21 DIAGNOSIS — N20.0 NEPHROLITHIASIS: ICD-10-CM

## 2024-03-21 DIAGNOSIS — K62.7 RADIATION PROCTITIS: ICD-10-CM

## 2024-03-21 DIAGNOSIS — E78.00 ELEVATED LDL CHOLESTEROL LEVEL: ICD-10-CM

## 2024-03-21 DIAGNOSIS — H93.13 TINNITUS OF BOTH EARS: ICD-10-CM

## 2024-03-21 DIAGNOSIS — Z00.00 HEALTHCARE MAINTENANCE: Primary | ICD-10-CM

## 2024-03-21 PROBLEM — N40.1 BENIGN LOCALIZED PROSTATIC HYPERPLASIA WITH LOWER URINARY TRACT SYMPTOMS (LUTS): Status: RESOLVED | Noted: 2023-02-17 | Resolved: 2024-03-21

## 2024-03-21 PROCEDURE — 1159F MED LIST DOCD IN RCRD: CPT | Performed by: INTERNAL MEDICINE

## 2024-03-21 PROCEDURE — 1124F ACP DISCUSS-NO DSCNMKR DOCD: CPT | Performed by: INTERNAL MEDICINE

## 2024-03-21 PROCEDURE — 1157F ADVNC CARE PLAN IN RCRD: CPT | Performed by: INTERNAL MEDICINE

## 2024-03-21 PROCEDURE — G0439 PPPS, SUBSEQ VISIT: HCPCS | Performed by: INTERNAL MEDICINE

## 2024-03-21 PROCEDURE — 1036F TOBACCO NON-USER: CPT | Performed by: INTERNAL MEDICINE

## 2024-03-21 PROCEDURE — 1170F FXNL STATUS ASSESSED: CPT | Performed by: INTERNAL MEDICINE

## 2024-03-21 PROCEDURE — G0446 INTENS BEHAVE THER CARDIO DX: HCPCS | Performed by: INTERNAL MEDICINE

## 2024-03-21 PROCEDURE — G0442 ANNUAL ALCOHOL SCREEN 15 MIN: HCPCS | Performed by: INTERNAL MEDICINE

## 2024-03-21 PROCEDURE — 93000 ELECTROCARDIOGRAM COMPLETE: CPT | Performed by: INTERNAL MEDICINE

## 2024-03-21 PROCEDURE — 99214 OFFICE O/P EST MOD 30 MIN: CPT | Performed by: INTERNAL MEDICINE

## 2024-03-21 PROCEDURE — 1160F RVW MEDS BY RX/DR IN RCRD: CPT | Performed by: INTERNAL MEDICINE

## 2024-03-21 PROCEDURE — 99397 PER PM REEVAL EST PAT 65+ YR: CPT | Performed by: INTERNAL MEDICINE

## 2024-03-21 ASSESSMENT — PATIENT HEALTH QUESTIONNAIRE - PHQ9
1. LITTLE INTEREST OR PLEASURE IN DOING THINGS: NOT AT ALL
2. FEELING DOWN, DEPRESSED OR HOPELESS: NOT AT ALL
SUM OF ALL RESPONSES TO PHQ9 QUESTIONS 1 AND 2: 0

## 2024-03-21 ASSESSMENT — ACTIVITIES OF DAILY LIVING (ADL)
GROCERY_SHOPPING: INDEPENDENT
DOING_HOUSEWORK: INDEPENDENT
BATHING: INDEPENDENT
MANAGING_FINANCES: INDEPENDENT
DRESSING: INDEPENDENT
TAKING_MEDICATION: INDEPENDENT

## 2024-03-21 ASSESSMENT — ENCOUNTER SYMPTOMS
LOSS OF SENSATION IN FEET: 0
DEPRESSION: 0
OCCASIONAL FEELINGS OF UNSTEADINESS: 0

## 2024-03-21 NOTE — PROGRESS NOTES
"Subjective   Reason for Visit: Sameer Emerson is an 68 y.o. male here for a Medicare Wellness visit.     Past Medical, Surgical, and Family History reviewed and updated in chart.    Reviewed all medications by prescribing practitioner or clinical pharmacist (such as prescriptions, OTCs, herbal therapies and supplements) and documented in the medical record.    HPI    Patient Care Team:  Temi Alvarez MD as PCP - [facility]  Temi Alvarez MD as PCP - [employer] PCP     Review of Systems    Objective   Vitals:  /60 (BP Location: Left arm, Patient Position: Sitting)   Pulse 69   Temp 36.2 °C (97.1 °F) (Temporal)   Ht 1.791 m (5' 10.5\")   Wt 78.9 kg (174 lb)   SpO2 98%   BMI 24.61 kg/m²       Physical Exam    Assessment/Plan   Problem List Items Addressed This Visit     Healthcare maintenance   Other Visit Diagnoses     Routine general medical examination at health care facility    -  Primary             "

## 2024-03-21 NOTE — PATIENT INSTRUCTIONS
It was a pleasure to see you today.  I would like to remind you about importance of a healthy lifestyle in order to improve your well-being and live longer. Try to engage in physical activities for at least 150 minutes per week.  Eat about 10 servings of fruits and vegetables daily. My advice is 2 servings of fruits and 8 servings of vegetables. For vegetables choose at least half of them green and at least half of them fresh.  Please avoid sugar, salt, fried food and saturated fat.  Please follow low carbohydrate diet and daily exercise routine for at least 30 minutes. Nutritional consultation is available, please let me know if you are interested. I will be happy to discuss details with you if interested.   Have a good day and stay well.      The ability to age comfortably depends on how you invest in your body.   Include physical activity in your daily routine. Physical activity increases blood flow to your whole body, including your brain. ...  Eat a healthy diet. A heart-healthy diet may benefit your brain.   Stay mentally active. Be social.   Treat cardiovascular disease.  No smoking, excessive EtOH intake or illicit drug use.

## 2024-03-26 DIAGNOSIS — N40.1 BENIGN PROSTATIC HYPERPLASIA WITH URINARY FREQUENCY: ICD-10-CM

## 2024-03-26 DIAGNOSIS — R35.0 BENIGN PROSTATIC HYPERPLASIA WITH URINARY FREQUENCY: ICD-10-CM

## 2024-03-26 RX ORDER — TADALAFIL 5 MG/1
5 TABLET ORAL DAILY
Qty: 30 TABLET | Refills: 5 | Status: SHIPPED | OUTPATIENT
Start: 2024-03-26 | End: 2024-09-22

## 2024-06-04 ENCOUNTER — LAB (OUTPATIENT)
Dept: LAB | Facility: LAB | Age: 69
End: 2024-06-04
Payer: MEDICARE

## 2024-06-04 DIAGNOSIS — E78.00 ELEVATED LDL CHOLESTEROL LEVEL: ICD-10-CM

## 2024-06-04 DIAGNOSIS — C61 MALIGNANT NEOPLASM OF PROSTATE (MULTI): ICD-10-CM

## 2024-06-04 LAB
ALBUMIN SERPL BCP-MCNC: 4 G/DL (ref 3.4–5)
ALP SERPL-CCNC: 64 U/L (ref 33–136)
ALT SERPL W P-5'-P-CCNC: 13 U/L (ref 10–52)
ANION GAP SERPL CALC-SCNC: 11 MMOL/L (ref 10–20)
AST SERPL W P-5'-P-CCNC: 17 U/L (ref 9–39)
BILIRUB SERPL-MCNC: 1 MG/DL (ref 0–1.2)
BUN SERPL-MCNC: 18 MG/DL (ref 6–23)
CALCIUM SERPL-MCNC: 8.7 MG/DL (ref 8.6–10.3)
CHLORIDE SERPL-SCNC: 104 MMOL/L (ref 98–107)
CHOLEST SERPL-MCNC: 185 MG/DL (ref 0–199)
CHOLESTEROL/HDL RATIO: 3.7
CO2 SERPL-SCNC: 29 MMOL/L (ref 21–32)
CREAT SERPL-MCNC: 1.09 MG/DL (ref 0.5–1.3)
EGFRCR SERPLBLD CKD-EPI 2021: 73 ML/MIN/1.73M*2
ERYTHROCYTE [DISTWIDTH] IN BLOOD BY AUTOMATED COUNT: 12.6 % (ref 11.5–14.5)
GLUCOSE SERPL-MCNC: 89 MG/DL (ref 74–99)
HCT VFR BLD AUTO: 45.2 % (ref 41–52)
HDLC SERPL-MCNC: 49.4 MG/DL
HGB BLD-MCNC: 14.6 G/DL (ref 13.5–17.5)
LDLC SERPL CALC-MCNC: 122 MG/DL
MCH RBC QN AUTO: 29.9 PG (ref 26–34)
MCHC RBC AUTO-ENTMCNC: 32.3 G/DL (ref 32–36)
MCV RBC AUTO: 92 FL (ref 80–100)
NON HDL CHOLESTEROL: 136 MG/DL (ref 0–149)
NRBC BLD-RTO: 0 /100 WBCS (ref 0–0)
PLATELET # BLD AUTO: 155 X10*3/UL (ref 150–450)
POTASSIUM SERPL-SCNC: 3.9 MMOL/L (ref 3.5–5.3)
PROT SERPL-MCNC: 6.2 G/DL (ref 6.4–8.2)
PSA SERPL-MCNC: <0.1 NG/ML
RBC # BLD AUTO: 4.89 X10*6/UL (ref 4.5–5.9)
SODIUM SERPL-SCNC: 140 MMOL/L (ref 136–145)
TRIGL SERPL-MCNC: 69 MG/DL (ref 0–149)
TSH SERPL-ACNC: 3.35 MIU/L (ref 0.44–3.98)
VLDL: 14 MG/DL (ref 0–40)
WBC # BLD AUTO: 6.9 X10*3/UL (ref 4.4–11.3)

## 2024-06-04 PROCEDURE — 80061 LIPID PANEL: CPT

## 2024-06-04 PROCEDURE — 36415 COLL VENOUS BLD VENIPUNCTURE: CPT

## 2024-06-04 PROCEDURE — 84153 ASSAY OF PSA TOTAL: CPT

## 2024-06-04 PROCEDURE — 85027 COMPLETE CBC AUTOMATED: CPT

## 2024-06-04 PROCEDURE — 80053 COMPREHEN METABOLIC PANEL: CPT

## 2024-06-04 PROCEDURE — 84443 ASSAY THYROID STIM HORMONE: CPT

## 2024-06-04 NOTE — RESULT ENCOUNTER NOTE
Romaine Estrella-  The cholesterol is adequate and still would like to see the LDL <100  Thyroid is normal range but a little toward the low function side and this has happened before and will monitor   The protein is low with our analyzer and not significant   Rest of the labs are good range

## 2024-06-17 ENCOUNTER — HOSPITAL ENCOUNTER (OUTPATIENT)
Dept: RADIOLOGY | Facility: CLINIC | Age: 69
Discharge: HOME | End: 2024-06-17
Payer: MEDICARE

## 2024-06-17 DIAGNOSIS — R91.8 LUNG NODULES: ICD-10-CM

## 2024-06-17 PROCEDURE — 71250 CT THORAX DX C-: CPT

## 2024-06-17 PROCEDURE — 71250 CT THORAX DX C-: CPT | Performed by: RADIOLOGY

## 2024-06-19 DIAGNOSIS — R91.8 LUNG NODULES: Primary | ICD-10-CM

## 2024-06-19 NOTE — RESULT ENCOUNTER NOTE
Romaine Blank-  The right thyroid is a little enlarged but no lymph nodes or masses   No changes in the lung nodules   Repeat in 1 year

## 2024-06-26 DIAGNOSIS — E53.8 VITAMIN B12 DEFICIENCY: ICD-10-CM

## 2024-06-26 DIAGNOSIS — E55.9 VITAMIN D INSUFFICIENCY: ICD-10-CM

## 2024-06-26 DIAGNOSIS — R91.8 LUNG NODULES: Primary | ICD-10-CM

## 2024-06-26 DIAGNOSIS — C61 PROSTATE CANCER (MULTI): ICD-10-CM

## 2024-06-27 ENCOUNTER — LAB (OUTPATIENT)
Dept: LAB | Facility: LAB | Age: 69
End: 2024-06-27
Payer: MEDICARE

## 2024-06-27 DIAGNOSIS — E55.9 VITAMIN D INSUFFICIENCY: ICD-10-CM

## 2024-06-27 DIAGNOSIS — E53.8 VITAMIN B12 DEFICIENCY: ICD-10-CM

## 2024-06-27 LAB
25(OH)D3 SERPL-MCNC: 24 NG/ML (ref 30–100)
VIT B12 SERPL-MCNC: 479 PG/ML (ref 211–911)

## 2024-06-27 PROCEDURE — 82607 VITAMIN B-12: CPT

## 2024-06-27 PROCEDURE — 36415 COLL VENOUS BLD VENIPUNCTURE: CPT

## 2024-06-27 PROCEDURE — 82306 VITAMIN D 25 HYDROXY: CPT

## 2024-06-28 NOTE — RESULT ENCOUNTER NOTE
Romaine Estrella-  The Vitamin D is low side but we are not doing the scripted Vitamin D at this level and recommend taking Vitamin D 3 at 5,000 units a day with food   The Vitamin B 12 is decent level

## 2024-07-05 ENCOUNTER — APPOINTMENT (OUTPATIENT)
Dept: RADIATION ONCOLOGY | Facility: HOSPITAL | Age: 69
End: 2024-07-05
Payer: MEDICARE

## 2024-07-09 ENCOUNTER — TELEPHONE (OUTPATIENT)
Dept: RADIATION ONCOLOGY | Facility: HOSPITAL | Age: 69
End: 2024-07-09
Payer: MEDICARE

## 2024-07-10 ENCOUNTER — HOSPITAL ENCOUNTER (OUTPATIENT)
Dept: RADIATION ONCOLOGY | Facility: HOSPITAL | Age: 69
Setting detail: RADIATION/ONCOLOGY SERIES
Discharge: HOME | End: 2024-07-10
Payer: MEDICARE

## 2024-07-10 DIAGNOSIS — R39.12 WEAK URINARY STREAM: ICD-10-CM

## 2024-07-10 DIAGNOSIS — C61 MALIGNANT NEOPLASM OF PROSTATE (MULTI): Primary | ICD-10-CM

## 2024-07-10 PROCEDURE — 99214 OFFICE O/P EST MOD 30 MIN: CPT

## 2024-07-10 RX ORDER — MIRABEGRON 50 MG/1
50 TABLET, FILM COATED, EXTENDED RELEASE ORAL DAILY
Qty: 30 TABLET | Refills: 5 | Status: SHIPPED | OUTPATIENT
Start: 2024-07-10 | End: 2025-01-06

## 2024-07-10 RX ORDER — MIRABEGRON 50 MG/1
50 TABLET, EXTENDED RELEASE ORAL DAILY
Qty: 30 TABLET | Refills: 10 | OUTPATIENT
Start: 2024-07-10

## 2024-07-10 RX ORDER — SILODOSIN 8 MG/1
8 CAPSULE ORAL
Qty: 30 CAPSULE | Refills: 5 | Status: SHIPPED | OUTPATIENT
Start: 2024-07-10 | End: 2025-01-06

## 2024-07-10 ASSESSMENT — ENCOUNTER SYMPTOMS
PSYCHIATRIC NEGATIVE: 1
FREQUENCY: 0
JOINT SWELLING: 0
DIFFICULTY URINATING: 0
ALLERGIC/IMMUNOLOGIC NEGATIVE: 1
FATIGUE: 0
ARTHRALGIAS: 0
BLOOD IN STOOL: 1
UNEXPECTED WEIGHT CHANGE: 0
SHORTNESS OF BREATH: 0
COUGH: 0
ABDOMINAL PAIN: 0
CONSTIPATION: 0
DYSURIA: 0
WEAKNESS: 0
RECTAL PAIN: 0
DIARRHEA: 0
CHEST TIGHTNESS: 0
HEMATURIA: 0
FEVER: 0
PALPITATIONS: 0
BACK PAIN: 0
DIZZINESS: 0
ANAL BLEEDING: 1
ROS GI COMMENTS: REFER TO HPI

## 2024-07-10 NOTE — PROGRESS NOTES
Cancer synopsis:  Rad/onc: Dr. Guy/ Percy CNP       Interval History:    Referred by Dr. Sameer Caceres.     He had an elevated PSA at 4.76 on 1/9/21, which increased to 5.8 on 6/27/21.      Prostate MRI on 8/7/21 showed:  1.  1.5 cm right posteromedial peripheral zone lesion extending from the base to the mid gland and 1.1 cm left posteromedial peripheral zone lesion in the mid gland the consistent with  PI-RADS 5 and 4  lesions respectively. Mild capsular irregularity associated with both these lesions concerning for extracapsular extension. Abutment of the medial aspect of the right seminal vesicle by the right base  peripheral zone lesion concerning for seminal vesicle invasion.  2. No pelvic lymphadenopathy.     The prostate was estimated to weigh 24.2g.     Prostate biopsy on 8/26/21 showed:  A.  PROSTATE, RIGHT APEX, BIOPSY:    --ADENOCARCINOMA, BRIANNE SCORE 3+4=7 (GRADE GROUP 2), INVOLVING TWO OF TWO  CORES AND 70% OF THE SUBMITTED TISSUE     B.  PROSTATE, RIGHT MID, BIOPSY:    --ADENOCARCINOMA, BRIANNE SCORE 4+3=7 (GRADE GROUP 3), INVOLVING TWO OF TWO  CORES AND 95% OF THE SUBMITTED TISSUE; SEE NOTE.     C.  PROSTATE, RIGHT BASE, BIOPSY:    --ADENOCARCINOMA, BRIANNE SCORE 3+3=6 (GRADE GROUP 1), INVOLVING ONE OF TWO  CORES AND LESS THAN 5% OF THE SUBMITTED TISSUE; SEE NOTE.     D.  PROSTATE, LEFT APEX, BIOPSY:    --ADENOCARCINOMA, BRIANNE SCORE 3+3=6 (GRADE GROUP 1), INVOLVING TWO OF TWO  CORES AND 5% OF THE SUBMITTED TISSUE     E.  PROSTATE, LEFT MID, BIOPSY:    --ADENOCARCINOMA, BRIANNE SCORE 3+3=6 (GRADE GROUP 1), INVOLVING TWO OF TWO  CORES AND 20% OF THE SUBMITTED TISSUE     F.  PROSTATE, LEFT BASE, BIOPSY:    --ADENOCARCINOMA, BRIANNE SCORE 3+3=6 (GRADE GROUP 1), INVOLVING TWO OF TWO  CORES AND 20% OF THE SUBMITTED TISSUE        SBRT completed on 12/17/21.     Completed course of Orgovyx.     Recent imaging:  none    History of presenting illness:    Patient ID: [id number]     Sameer LUISA Emerson is a 68  y.o. male who presents unfavorable  intermediate risk prostate cancer, [id number], Luly 3+4=7 (12/12 involved cores), iPSA 5.8 now s/p RT 12/17/2021    RT Site: Prostate   RT Date: 12/08/2021  Hormone therapy: Yes, st-term orgovyx with testerone recovery  Hot Flushes: Denies  Fatigue: Denies  Bone pain: Denies  FLYNN: n/a virtual  ED: Does report some changes in sexual function. Has not used PDE5i in the past.  ED medications: No  IPSS: n/a virtual  Urinary symptoms: Denies reports hematuria, frequency or urgency, urine leakage.  Urinary Medications: Yes, silodosin 8 mg, Cialis 5 mg and mybrtriq 50mg  Rectal bleeding: Reports rectal bleeding about once a week. Sometimes can be 2-3 days a in a row. Recently had hemmoird banding. Is to have GI appointment in coming month for colonoscopy in 09/2024.  Other systems: Denies SOB, CP or fever. Does report some muscle loss and weight gain since hormone therapy.    Review of systems:  Review of Systems   Constitutional:  Negative for fatigue, fever and unexpected weight change.   Respiratory:  Negative for cough, chest tightness and shortness of breath.    Cardiovascular:  Negative for chest pain, palpitations and leg swelling.   Gastrointestinal:  Positive for anal bleeding and blood in stool. Negative for abdominal pain, constipation, diarrhea and rectal pain.        Refer to HPI   Endocrine: Negative for cold intolerance, heat intolerance and polyuria.   Genitourinary:  Negative for decreased urine volume, difficulty urinating, dysuria, frequency, hematuria and urgency.        Refer to HPI   Musculoskeletal:  Negative for arthralgias, back pain, gait problem and joint swelling.   Skin: Negative.    Allergic/Immunologic: Negative.    Neurological:  Negative for dizziness, syncope and weakness.   Psychiatric/Behavioral: Negative.         Past Medical history  No past medical history on file.     Surgical/family history  Family History   Problem Relation Name Age of Onset     Alzheimer's disease Mother      Glaucoma Father      Pneumonia Father        No past surgical history on file.     Social History  Tobacco Use: Low Risk  (3/21/2024)    Patient History     Smoking Tobacco Use: Never     Smokeless Tobacco Use: Never     Passive Exposure: Not on file       Current med list:  Current Outpatient Medications   Medication Instructions    Myrbetriq 50 mg tablet extended release 24 hr 24 hr tablet 1 tablet, oral, Daily    silodosin (RAPAFLO) 8 mg, oral, Daily with breakfast    tadalafil (CIALIS) 5 mg, oral, Daily      Last recorded vital:  There were no vitals taken for this visit.    Physical exam  N/a virtual    Pertinent labs:  Prostate Specific AG   Date/Time Value Ref Range Status   06/04/2024 07:16 AM <0.10 <=4.00 ng/mL Final     Dx:  Problem List Items Addressed This Visit    None  Visit Diagnoses       Malignant neoplasm of prostate (Multi)        Relevant Orders    Clinic Appointment Request Follow Up; [facility] (virtual); [facility] [address] [facility] (virtual) (Completed)         PSA of <0.10 was reviewed and is undectable. Review of latent SE including rectal bleeding, hematuria, urinary strictures, ED where reviewed as well as how to contact office if s/s present. Does report occasional rectal bleeding.  Reports some mild decline in erectile function. Denies other latent SE.     Discussed ongoing rectal bleeding and the onset of radiation proctitis. Reviewed treatment measures including conservative monitoring, rectal enemas including proctofoam and sucralfate as well as in refractory cases the usage of homian laser or HBO. At this time patient is going to have a colonoscopy performed and killian let rad/onc know when done. Will assess at this time for enema need or not.    Reviewed daily tadalafil and silodsin, and mybretiqe patient will continue at this time. Refills sent    PLAN:  FUV 6m virtually  Labs Psa in 6m  Imaging none  Silodosin daily  FUV other providers: PCP for routine  evals    Please contact office with any concerns:  Domo Fink CNP  [phone]

## 2024-07-15 DIAGNOSIS — N40.1 BENIGN PROSTATIC HYPERPLASIA WITH URINARY FREQUENCY: ICD-10-CM

## 2024-07-15 DIAGNOSIS — R35.0 BENIGN PROSTATIC HYPERPLASIA WITH URINARY FREQUENCY: ICD-10-CM

## 2024-07-15 DIAGNOSIS — R39.12 WEAK URINARY STREAM: ICD-10-CM

## 2024-07-15 RX ORDER — TADALAFIL 5 MG/1
5 TABLET ORAL DAILY
Qty: 30 TABLET | Refills: 5 | Status: SHIPPED | OUTPATIENT
Start: 2024-07-15 | End: 2025-01-11

## 2024-07-15 RX ORDER — SILODOSIN 8 MG/1
8 CAPSULE ORAL
Qty: 30 CAPSULE | Refills: 5 | Status: SHIPPED | OUTPATIENT
Start: 2024-07-15 | End: 2025-01-11

## 2024-09-17 ENCOUNTER — TELEPHONE (OUTPATIENT)
Dept: PRIMARY CARE | Facility: CLINIC | Age: 69
End: 2024-09-17
Payer: MEDICARE

## 2024-09-23 ENCOUNTER — TELEPHONE (OUTPATIENT)
Dept: GASTROENTEROLOGY | Facility: CLINIC | Age: 69
End: 2024-09-23
Payer: MEDICARE

## 2024-09-27 ENCOUNTER — HOSPITAL ENCOUNTER (OUTPATIENT)
Dept: GASTROENTEROLOGY | Facility: HOSPITAL | Age: 69
Setting detail: OUTPATIENT SURGERY
Discharge: HOME | End: 2024-09-27
Payer: MEDICARE

## 2024-09-27 VITALS
OXYGEN SATURATION: 94 % | DIASTOLIC BLOOD PRESSURE: 70 MMHG | HEART RATE: 63 BPM | WEIGHT: 165 LBS | RESPIRATION RATE: 16 BRPM | HEIGHT: 70 IN | SYSTOLIC BLOOD PRESSURE: 93 MMHG | BODY MASS INDEX: 23.62 KG/M2 | TEMPERATURE: 97.3 F

## 2024-09-27 DIAGNOSIS — K62.7 RADIATION PROCTITIS: Primary | ICD-10-CM

## 2024-09-27 PROCEDURE — 2720000007 HC OR 272 NO HCPCS

## 2024-09-27 PROCEDURE — 7100000009 HC PHASE TWO TIME - INITIAL BASE CHARGE

## 2024-09-27 PROCEDURE — 3700000013 HC SEDATION LEVEL 5+ TIME - EACH ADDITIONAL 15 MINUTES

## 2024-09-27 PROCEDURE — 2500000004 HC RX 250 GENERAL PHARMACY W/ HCPCS (ALT 636 FOR OP/ED): Performed by: INTERNAL MEDICINE

## 2024-09-27 PROCEDURE — 3700000012 HC SEDATION LEVEL 5+ TIME - INITIAL 15 MINUTES 5/> YEARS

## 2024-09-27 PROCEDURE — 7100000010 HC PHASE TWO TIME - EACH INCREMENTAL 1 MINUTE

## 2024-09-27 PROCEDURE — 45385 COLONOSCOPY W/LESION REMOVAL: CPT | Performed by: INTERNAL MEDICINE

## 2024-09-27 RX ORDER — MEPERIDINE HYDROCHLORIDE 25 MG/ML
INJECTION INTRAMUSCULAR; INTRAVENOUS; SUBCUTANEOUS AS NEEDED
Status: COMPLETED | OUTPATIENT
Start: 2024-09-27 | End: 2024-09-27

## 2024-09-27 RX ORDER — MIDAZOLAM HYDROCHLORIDE 1 MG/ML
INJECTION, SOLUTION INTRAMUSCULAR; INTRAVENOUS AS NEEDED
Status: COMPLETED | OUTPATIENT
Start: 2024-09-27 | End: 2024-09-27

## 2024-09-27 RX ORDER — FENTANYL CITRATE 50 UG/ML
INJECTION, SOLUTION INTRAMUSCULAR; INTRAVENOUS AS NEEDED
Status: COMPLETED | OUTPATIENT
Start: 2024-09-27 | End: 2024-09-27

## 2024-09-27 RX ORDER — SODIUM CHLORIDE, SODIUM LACTATE, POTASSIUM CHLORIDE, CALCIUM CHLORIDE 600; 310; 30; 20 MG/100ML; MG/100ML; MG/100ML; MG/100ML
20 INJECTION, SOLUTION INTRAVENOUS CONTINUOUS
Status: DISCONTINUED | OUTPATIENT
Start: 2024-09-27 | End: 2024-09-28 | Stop reason: HOSPADM

## 2024-09-27 ASSESSMENT — PAIN SCALES - GENERAL
PAINLEVEL_OUTOF10: 0 - NO PAIN

## 2024-09-27 ASSESSMENT — PAIN - FUNCTIONAL ASSESSMENT
PAIN_FUNCTIONAL_ASSESSMENT: 0-10
PAIN_FUNCTIONAL_ASSESSMENT: UNABLE TO SELF-REPORT
PAIN_FUNCTIONAL_ASSESSMENT: 0-10
PAIN_FUNCTIONAL_ASSESSMENT: UNABLE TO SELF-REPORT
PAIN_FUNCTIONAL_ASSESSMENT: 0-10
PAIN_FUNCTIONAL_ASSESSMENT: UNABLE TO SELF-REPORT
PAIN_FUNCTIONAL_ASSESSMENT: 0-10

## 2024-09-27 ASSESSMENT — ENCOUNTER SYMPTOMS
FEVER: 0
SORE THROAT: 0
SHORTNESS OF BREATH: 0
LIGHT-HEADEDNESS: 0
DYSURIA: 0
WEAKNESS: 0
ABDOMINAL PAIN: 0
COUGH: 0
CHILLS: 0
ABDOMINAL DISTENTION: 0

## 2024-09-27 NOTE — DISCHARGE INSTRUCTIONS
Patient Instructions after a Colonoscopy      The anesthetics, sedatives or narcotics which were given to you today will be acting in your body for the next 24 hours, so you might feel a little sleepy or groggy.  This feeling should slowly wear off. Carefully read and follow the instructions.     You received sedation today:  - Do not drive or operate any machinery or power tools of any kind.   - No alcoholic beverages today, not even beer or wine.  - Do not make any important decisions or sign any legal documents.  - No over the counter medications that contain alcohol or that may cause drowsiness.  - Do not make any important decisions or sign any legal documents.  - Make sure you have someone with you for first 24 hours.    While it is common to experience mild to moderate abdominal distention, gas, or belching after your procedure, if any of these symptoms occur following discharge from the GI Lab or within one week of having your procedure, call the [facility] to be advised whether a visit to your nearest Urgent Care or Emergency Department is indicated.  Take this paper with you if you go.     - If you develop an allergic reaction to the medications that were given during your procedure such as difficulty breathing, rash, hives, severe nausea, vomiting or lightheadedness.  - If you experience chest pain, shortness of breath, severe abdominal pain, fevers and chills.  -If you develop signs and symptoms of bleeding such as blood in your spit, if your stools turn black, tarry, or bloody  - If you have not urinated within 8 hours following your procedure.  - If your IV site becomes painful, red, inflamed, or looks infected.    If you received a biopsy/polypectomy/sphincterotomy the following instructions apply below:    __ Do not use Aspirin containing products, non-steroidal medications or anti-coagulants for one week following your procedure. (Examples of these types of medications are: Advil,  Arthrotec, Aleve, Coumadin, Ecotrin, Heparin, Ibuprofen, Indocin, Motrin, Naprosyn, Nuprin, Plavix, Vioxx, and Voltarin, or their generic forms.  This list is not all-inclusive.  Check with your physician or pharmacist before resuming medications.)   __ Eat a soft diet today.  Avoid foods that are poorly digested for the next 24 hours.  These foods would include: nuts, beans, lettuce, red meats, and fried foods. Start with liquids and advance your diet as tolerated, gradually work up to eating solids.   __ Do not have a Barium Study or Enema for one week.    Your physician recommends the additional following instructions:    -You have a contact number available for emergencies. The signs and symptoms of potential delayed complications were discussed with you. You may return to normal activities tomorrow.  -Resume your previous diet.  -Continue your present medications.   -We are waiting for your pathology results.  -Your physician has recommended a repeat colonoscopy (date to be determined after pending pathology results are reviewed) for surveillance based on pathology results.  -The findings and recommendations have been discussed with you.  -The findings and recommendations were discussed with your family.  - Please see Medication Reconciliation Form for new medication/medications prescribed.       If you experience any problems or have any questions following discharge from the GI Lab, please call:    Osmin Caro MD  [phone]    To reach your physician after hours call [phone] and ask for the GI Fellow on call      Nurse Signature                                                                        Date___________________                                                                            Patient/Responsible Party Signature                                        Date___________________

## 2024-09-27 NOTE — H&P
History Of Present Illness  Sameer Emerson is a 69 y.o. male with hx of prostateCa presenting for colonoscopy BRBPR concerning for radiation proctitis. Pt reports symptoms have improved the past month; he is still having small amounts of blood with stools but much improved from prior.     No family hx of colonCa. Pt does not take AC or antiplatelets.      Past Medical History  No past medical history on file.  Surgical History  No past surgical history on file.  Social History  He reports that he has never smoked. He has never used smokeless tobacco. He reports that he does not currently use alcohol. He reports that he does not use drugs.    Family History  Family History   Problem Relation Name Age of Onset    Alzheimer's disease Mother      Glaucoma Father      Pneumonia Father          Allergies  No Known Allergies  Review of Systems   Constitutional:  Negative for chills and fever.   HENT:  Negative for sore throat.    Respiratory:  Negative for cough and shortness of breath.    Cardiovascular:  Negative for chest pain.   Gastrointestinal:  Negative for abdominal distention and abdominal pain.   Genitourinary:  Negative for dysuria.   Neurological:  Negative for weakness and light-headedness.     Pre-sedation Evaluation:  ASA Classification - ASA 2 - Patient with mild systemic disease with no functional limitations  Mallampati Score - I (soft palate, uvula, fauces, and tonsillar pillars visible)    Physical Exam  Constitutional:       General: He is not in acute distress.  HENT:      Head: Normocephalic and atraumatic.      Mouth/Throat:      Mouth: Mucous membranes are moist.   Eyes:      Extraocular Movements: Extraocular movements intact.   Cardiovascular:      Rate and Rhythm: Normal rate and regular rhythm.   Pulmonary:      Effort: Pulmonary effort is normal. No respiratory distress.   Abdominal:      General: There is no distension.      Palpations: Abdomen is soft.   Musculoskeletal:         General:  "Normal range of motion.   Skin:     General: Skin is warm and dry.   Neurological:      General: No focal deficit present.      Mental Status: He is alert and oriented to person, place, and time.          Last Recorded Vitals  Blood pressure 129/69, pulse 62, temperature 36.3 °C (97.3 °F), resp. rate 16, height 1.778 m (5' 10\"), weight 74.8 kg (165 lb), SpO2 96%.     Assessment/Plan   Proceed with colonoscopy.      PTA/Current Medications:  (Not in a hospital admission)    Current Outpatient Medications   Medication Sig Dispense Refill    Myrbetriq 50 mg tablet extended release 24 hr 24 hr tablet Take 1 tablet (50 mg) by mouth once daily. 30 tablet 5    silodosin (Rapaflo) 8 mg capsule Take 1 capsule (8 mg) by mouth once daily with breakfast. 30 capsule 5    tadalafil (Cialis) 5 mg tablet Take 1 tablet (5 mg) by mouth once daily. 30 tablet 5     No current facility-administered medications for this encounter.     Bárbara Beauchamp MD  "

## 2024-09-30 NOTE — SIGNIFICANT EVENT
Asking when he will have a BM, He was advancing diet slowly. Eating mainly toast and cream of wheat.

## 2024-10-03 ENCOUNTER — APPOINTMENT (OUTPATIENT)
Dept: PRIMARY CARE | Facility: CLINIC | Age: 69
End: 2024-10-03
Payer: MEDICARE

## 2024-10-03 DIAGNOSIS — Z23 NEED FOR INFLUENZA VACCINATION: ICD-10-CM

## 2024-10-03 LAB
LABORATORY COMMENT REPORT: NORMAL
PATH REPORT.FINAL DX SPEC: NORMAL
PATH REPORT.GROSS SPEC: NORMAL
PATH REPORT.TOTAL CANCER: NORMAL

## 2024-10-03 PROCEDURE — 90662 IIV NO PRSV INCREASED AG IM: CPT | Performed by: INTERNAL MEDICINE

## 2024-10-03 PROCEDURE — G0008 ADMIN INFLUENZA VIRUS VAC: HCPCS | Performed by: INTERNAL MEDICINE

## 2024-11-27 DIAGNOSIS — C61 MALIGNANT NEOPLASM OF PROSTATE (MULTI): ICD-10-CM

## 2024-11-27 RX ORDER — MIRABEGRON 50 MG/1
50 TABLET, FILM COATED, EXTENDED RELEASE ORAL DAILY
Qty: 30 TABLET | Refills: 5 | Status: SHIPPED | OUTPATIENT
Start: 2024-11-27 | End: 2025-05-26

## 2024-12-16 ENCOUNTER — LAB (OUTPATIENT)
Dept: LAB | Facility: LAB | Age: 69
End: 2024-12-16
Payer: MEDICARE

## 2024-12-16 DIAGNOSIS — C61 MALIGNANT NEOPLASM OF PROSTATE (MULTI): ICD-10-CM

## 2024-12-16 LAB — PSA SERPL-MCNC: <0.1 NG/ML

## 2024-12-16 PROCEDURE — 36415 COLL VENOUS BLD VENIPUNCTURE: CPT

## 2024-12-16 PROCEDURE — 84153 ASSAY OF PSA TOTAL: CPT

## 2025-01-08 ENCOUNTER — TELEPHONE (OUTPATIENT)
Dept: RADIATION ONCOLOGY | Facility: HOSPITAL | Age: 70
End: 2025-01-08
Payer: MEDICARE

## 2025-01-09 ASSESSMENT — ENCOUNTER SYMPTOMS
ARTHRALGIAS: 0
SHORTNESS OF BREATH: 0
PALPITATIONS: 0
DYSURIA: 0
ALLERGIC/IMMUNOLOGIC NEGATIVE: 1
FATIGUE: 0
DIZZINESS: 0
DIARRHEA: 0
CONSTIPATION: 0
DIFFICULTY URINATING: 0
CHEST TIGHTNESS: 0
PSYCHIATRIC NEGATIVE: 1
ROS GI COMMENTS: REFER TO HPI
WEAKNESS: 0
BLOOD IN STOOL: 1
HEMATURIA: 0
ANAL BLEEDING: 1
ABDOMINAL PAIN: 0
UNEXPECTED WEIGHT CHANGE: 0
RECTAL PAIN: 0
FREQUENCY: 0
JOINT SWELLING: 0
COUGH: 0
BACK PAIN: 0
FEVER: 0

## 2025-01-09 NOTE — PROGRESS NOTES
Cancer synopsis:  Rad/onc: Dr. Guy/ [facility]    Elevated PSA at 4.76 on 1/9/21, which increased to 5.8 on 6/27/21.      Prostate MRI on 8/7/21 showed:  1.  1.5 cm right posteromedial peripheral zone lesion extending from the base to the mid gland and 1.1 cm left posteromedial peripheral zone lesion in the mid gland the consistent with  PI-RADS 5 and 4  lesions respectively. Mild capsular irregularity associated with both these lesions concerning for extracapsular extension. Abutment of the medial aspect of the right seminal vesicle by the right base  peripheral zone lesion concerning for seminal vesicle invasion.  2. No pelvic lymphadenopathy.     The prostate was estimated to weigh 24.2g.     Prostate biopsy on 8/26/21 GG2     SBRT completed on 12/17/21.     Completed course of Orgovyx.     Recent imaging:  none    History of presenting illness:    Patient ID: [id number]     Sameer Emerson is a 69 y.o. male who presents unfavorable  intermediate risk prostate cancer, [id number], Luly 3+4=7 (12/12 involved cores), iPSA 5.8 now s/p RT 12/17/2021    RT Site: Prostate   RT Date: 12/08/2021  Hormone therapy: Yes, st-term orgovyx with testerone recovery  Hot Flushes: Denies  Fatigue: Denies  Bone pain: Denies  FLYNN: n/a virtual  ED: Does report some changes in sexual function. Has not used PDE5i in the past.  ED medications: No  IPSS: n/a virtual  Urinary symptoms: Denies reports hematuria, frequency or urgency, urine leakage.  Urinary Medications: Yes, silodosin 8 mg, Cialis 5 mg and mybrtriq 50mg  Rectal bleeding: Reports rectal bleeding about once a week. Sometimes can be 2-3 days a in a row. Recently had hemmoird banding. Is to have GI appointment in coming month for colonoscopy in 09/2024.  Other systems: Denies SOB, CP or fever. Does report some muscle loss and weight gain since hormone therapy.    Review of systems:  Review of Systems   Constitutional:  Negative for fatigue, fever and unexpected weight  change.   Respiratory:  Negative for cough, chest tightness and shortness of breath.    Cardiovascular:  Negative for chest pain, palpitations and leg swelling.   Gastrointestinal:  Positive for anal bleeding and blood in stool. Negative for abdominal pain, constipation, diarrhea and rectal pain.        Refer to HPI   Endocrine: Negative for cold intolerance, heat intolerance and polyuria.   Genitourinary:  Negative for decreased urine volume, difficulty urinating, dysuria, frequency, hematuria and urgency.        Refer to HPI   Musculoskeletal:  Negative for arthralgias, back pain, gait problem and joint swelling.   Skin: Negative.    Allergic/Immunologic: Negative.    Neurological:  Negative for dizziness, syncope and weakness.   Psychiatric/Behavioral: Negative.       Past Medical history  No past medical history on file.     Surgical/family history  Family History   Problem Relation Name Age of Onset    Alzheimer's disease Mother      Glaucoma Father      Pneumonia Father        No past surgical history on file.     Social History  Tobacco Use: Low Risk  (3/21/2024)    Patient History     Smoking Tobacco Use: Never     Smokeless Tobacco Use: Never     Passive Exposure: Not on file       Current med list:  Current Outpatient Medications   Medication Instructions    Myrbetriq 50 mg, oral, Daily    silodosin (RAPAFLO) 8 mg, oral, Daily with breakfast    tadalafil (CIALIS) 5 mg, oral, Daily      Last recorded vital:  There were no vitals taken for this visit.    Physical exam  N/a virtual    Pertinent labs:  Prostate Specific AG   Date/Time Value Ref Range Status   12/16/2024 09:01 AM <0.10 <=4.00 ng/mL Final     Dx:  Problem List Items Addressed This Visit    None  Visit Diagnoses       Malignant neoplasm of prostate (Multi)    -  Primary    Relevant Orders    Clinic Appointment Request Follow Up; [facility] (virtual); [facility] [address] (virtual) (Completed)        PSA of <0.10 was reviewed and is  undectable. Review of latent SE including rectal bleeding, hematuria, urinary strictures, ED where reviewed as well as how to contact office if s/s present. Does report occasional rectal bleeding.  Reports some mild decline in erectile function. Denies other latent SE.     Discussed ongoing rectal bleeding and the onset of radiation proctitis. Reviewed treatment measures including conservative monitoring, rectal enemas including proctofoam and sucralfate as well as in refractory cases the usage of homian laser or HBO. At this time patient is going to have a colonoscopy performed and killian let rad/onc know when done. Will assess at this time for enema need or not.    Reviewed daily tadalafil and silodsin, and mybretiqe patient will continue at this time. Refills sent    PLAN:  FUV 6m virtually  Labs Psa in 6m  Imaging none  Silodosin daily  Cialis 5mg daily  Mybretriq daily   FUV other providers: PCP for routine evals    Please contact office with any concerns:  Domo Fink CNP  [phone]

## 2025-01-10 ENCOUNTER — HOSPITAL ENCOUNTER (OUTPATIENT)
Dept: RADIATION ONCOLOGY | Facility: HOSPITAL | Age: 70
Setting detail: RADIATION/ONCOLOGY SERIES
Discharge: HOME | End: 2025-01-10
Payer: MEDICARE

## 2025-01-10 DIAGNOSIS — N40.1 BENIGN PROSTATIC HYPERPLASIA WITH URINARY FREQUENCY: ICD-10-CM

## 2025-01-10 DIAGNOSIS — C61 MALIGNANT NEOPLASM OF PROSTATE (MULTI): Primary | ICD-10-CM

## 2025-01-10 DIAGNOSIS — R39.12 WEAK URINARY STREAM: ICD-10-CM

## 2025-01-10 DIAGNOSIS — R35.0 BENIGN PROSTATIC HYPERPLASIA WITH URINARY FREQUENCY: ICD-10-CM

## 2025-01-10 RX ORDER — TADALAFIL 5 MG/1
5 TABLET ORAL DAILY
Qty: 90 TABLET | Refills: 1 | OUTPATIENT
Start: 2025-01-10 | End: 2025-07-09

## 2025-01-10 RX ORDER — SILODOSIN 8 MG/1
8 CAPSULE ORAL
Qty: 90 CAPSULE | Refills: 1 | OUTPATIENT
Start: 2025-01-10 | End: 2025-07-09

## 2025-01-13 DIAGNOSIS — R39.12 WEAK URINARY STREAM: ICD-10-CM

## 2025-01-13 DIAGNOSIS — C61 MALIGNANT NEOPLASM OF PROSTATE (MULTI): Primary | ICD-10-CM

## 2025-01-13 RX ORDER — SILODOSIN 8 MG/1
8 CAPSULE ORAL
Qty: 90 CAPSULE | Refills: 1 | Status: SHIPPED | OUTPATIENT
Start: 2025-01-13 | End: 2025-07-12

## 2025-01-15 DIAGNOSIS — C61 MALIGNANT NEOPLASM OF PROSTATE (MULTI): ICD-10-CM

## 2025-01-15 RX ORDER — MIRABEGRON 50 MG/1
50 TABLET, FILM COATED, EXTENDED RELEASE ORAL DAILY
Qty: 30 TABLET | Refills: 5 | Status: SHIPPED | OUTPATIENT
Start: 2025-01-15 | End: 2025-01-18 | Stop reason: WASHOUT

## 2025-01-18 DIAGNOSIS — N40.1 BENIGN LOCALIZED PROSTATIC HYPERPLASIA WITH LOWER URINARY TRACT SYMPTOMS (LUTS): Primary | ICD-10-CM

## 2025-01-18 RX ORDER — MIRABEGRON 50 MG/1
50 TABLET, FILM COATED, EXTENDED RELEASE ORAL DAILY
Qty: 30 TABLET | Refills: 5 | Status: SHIPPED | OUTPATIENT
Start: 2025-01-18 | End: 2025-07-17

## 2025-03-25 NOTE — PROGRESS NOTES
Subjective   Reason for Visit: Sameer Emerson is an 69 y.o. male here for his Subsequent Medicare Assessment, annual physical and follow up             He is doing well overall.    His prostate cancer remains in remission  He is not sure the Myrbetriq is doing anything for him.   He is having 1- 2 episodes of nocturia, it varies   He drinks 1 cup of coffee in the morning and avoids bladder irritants     He is planning on getting the shingles vaccine but concerned for getting sick afterwards  He is down for a day after each SARS vaccine he has had     He is going to repeat the CT chest in 6/25.     He follows with ophthalmology yearly and has no new issues to report     He has sock rings around the ankles at the end of the day     HEALTH:  PSA  3/19, 4/21, 3/22, 1/23, 12/23, 12/24   Colon 9/24 fragments of TA and Q 3   Cologuard normal 10/22    AAA US normal in 4/2022   Ca cardiac score 6/2023 was 0.6.  EKG 2009, 3/22, 3/24  Urine  Hep C normal 10/20   Hep B x 3 done  Flu 10/20, 10/21, 10/22, 10/23, 10/24   TDAP 2015, will update with injury   RSV discussed   Prevnar 11/2020  Pneumo 11/2021   Zostavax never   Shingrix recommended and will check local pharmacies   SARS-CoV-2- 1/21, 2/21, 10/21, 5/22, 8/23, 1/24, 9/24   Ophth Sees yearly. He wears reading glasses only. His father had glaucoma.  He has no history of glaucoma or MD.   Copy of his Advanced Directives scanned in his chart 3/2022      Patient Care Team:  Temi Alvarez MD as PCP - General  Temi Alvarez MD as PCP - [employer] PCP     Review of Systems  All systems negative except those listed in the HPI      Past Medical, Surgical, and Family History reviewed and updated in chart.  Reviewed all medications by prescribing practitioner or clinical pharmacist   (such as prescriptions, OTCs, herbal therapies and supplements) and documented in the medical record       Objective   Vitals:  Visit Vitals  /60 (BP Location: Left arm,  Patient Position: Sitting, BP Cuff Size: Adult)   Pulse 61    Body mass index is 25.54 kg/m².     Physical Exam  Vitals reviewed.   Constitutional:       Appearance: Normal appearance. He is normal weight.   HENT:      Head: Normocephalic.      Right Ear: Tympanic membrane, ear canal and external ear normal.      Left Ear: Tympanic membrane, ear canal and external ear normal.      Ears:      Comments: Cerumen build up bilaterally without impaction      Nose: Nose normal.      Mouth/Throat:      Pharynx: Oropharynx is clear.   Eyes:      Conjunctiva/sclera: Conjunctivae normal.   Cardiovascular:      Rate and Rhythm: Normal rate and regular rhythm.      Pulses: Normal pulses.      Heart sounds: Normal heart sounds.   Pulmonary:      Effort: Pulmonary effort is normal.      Breath sounds: Normal breath sounds.   Abdominal:      General: Bowel sounds are normal.      Palpations: Abdomen is soft.   Musculoskeletal:         General: Normal range of motion.      Cervical back: Normal range of motion and neck supple.   Skin:     General: Skin is warm.   Neurological:      General: No focal deficit present.      Mental Status: He is alert and oriented to person, place, and time.   Psychiatric:         Mood and Affect: Mood normal.         Behavior: Behavior normal.         Thought Content: Thought content normal.         Judgment: Judgment normal.       Assessment & Plan  Lipid screening         Healthcare maintenance         Elevated LDL cholesterol level    Orders:    CBC; Future    Comprehensive Metabolic Panel; Future    Lipid Panel; Future    TSH with reflex to Free T4 if abnormal; Future         Subsequent Medicare Assessment, annual physical and follow up completed   Labs ordered     Medicare Wellness completed  -  Discussed healthy diet and regular exercise.    -  Physical exam overall unremarkable. Immunizations reviewed and updated accordingly. Healthy lifestyle choices discussed (tobacco avoidance, appropriate  alcohol use, avoidance of illicit substances).   -  Patient is wearing seatbelt.   -  Screening lab work ordered as indicated.    -  Age appropriate screening tests reviewed with patient.          We spent 15 minutes discussing depression screen and there is nothing found that is of concern for underling depression. The PQH form was filled and the meds reviewed. No depression to report      We spent 5 minutes discussing alcohol use and there are no concerns about overuse. The 5 min was spent in going over any issues of use of alcohol.   No EtOH      He has grab bars in the shower.  He has not fallen recently and no risk of falls in the house   He has good lighting around the house and functioning smoke detectors.      He is  with 2 children. He denies previous history of tobacco use    He is a retired Podiatrist /Foot and Ankle Surgeon   He is enjoying bird watching/ photography and music     Cerumen build up without impaction bilaterally  Recommend using equal parts peroxide 3% and warm water 3-5 drops until resolved    He has sock rings around the ankles at the end of the day   Recommend support stockings especially with travel       He has a history of tinnitus.    He has hearing aids but does not wear them      His weight/ BMI is in normal range in office, recommend he maintain.  His weight is 178 pounds with BMI at 25.54 IO 3/25   He has lost a lot of muscle mass since treatment for prostate cancer    Discussed importance of protein intake.   Recommend strength training twice weekly      BP stable. We will continue to monitor.    EKG in 3/24 normal sinus, HR 58, no LVH or strain pattern noted   AAA US normal in 4/2022   Continue to monitor BP at home and call with elevated readings       I have spent 15 min face to face with this patient discussing their cardiac risk   We discussed the patients cardiovascular risk. If needed, lifestyle modifications recommended including: behavioral therapies of  nutrition choices, exercise and eliminate habits that are contributing to their cardiac risk. We agreed to a plan to decrease his cardiovascular risks. Discussed ASA. Reviewed Guidelines and approved recommendations made to patient.   The patient's 10 yr CV risk was estimated at  13.7 % IO 3/25     Elevated lipids: Labs ordered and we will adjust if indicated  3/25  Explained goal for LDL to be less than 100 and ideally less than 70   Explained goal for HDL to be between 55- 60. Increase exercise    Ca cardiac score 6/23 score was 0.6. Within limitation from respiratory motion, there is suspected 6-7 mm solid pulmonary nodule within left lower lobe. In absence of prior cross-sectional imaging of chest  Recommend he begin Metamucil and titrate to 1 Tbsp BID as tolerated    Recommend he look into a plant based/ whole foods diet     Lung nodules:  Ca cardiac score 6/23 score was 0.6. Within limitation from respiratory motion, there is suspected 6-7 mm solid pulmonary nodule within left lower lobe. In absence of prior cross-sectional imaging of chest  CT chest 8/23 Unchanged 5 mm subpleural nodule in the left lower lobe compared to very recent examination on 06/28/2023. Additional solid nodules are noted throughout the lungs measuring up to 5 mm. These nodules are nonspecific, but favored to be benign in nature. However, given history of malignancy.  CT chest 6/24 Stable nodular densities with no new pulmonary nodules identified. Left renal cyst.  CT ordered 6/24 for 6/25       Seasonal allergies:  Continue Claritin prn and Flonase NS prn allergies.     Recommend using a humidifier in the bedroom at night  Recommend trying OTC Xlear NS BID     Situational stressors:  His wife is anxious and worries a lot.   He is very social and is still doing the things he enjoys.     Hx of renal stones with lithotripsy in 2009 and no recurrence      Benign localized prostatic hyperplasia with LUTS/ Prostate cancer: His prostate cancer  remains in remission. He is not sure the Myrbetriq is doing anything for him. He is having 1- 2 episodes of nocturia, it varies. He drinks 1 cup of coffee in the morning and avoids bladder irritants.   Treated with RTX therapy (SBRT) in 2021  Dx with prostate cancer. He underwent radiation and finished the hormone therapy   Prostate MRI on 8/7/21 showed: 1.5 cm right posteromedial peripheral zone lesion extending from the base to the mid gland and 1.1 cm left posteromedial peripheral zone lesion in the mid gland the consistent with  PI-RADS 5 and 4 lesions respectively. Mild capsular irregularity associated with both these lesions concerning for extracapsular extension. Abutment of the medial aspect of the right seminal vesicle by the right base peripheral zone lesion concerning for seminal vesicle invasion. No pelvic lymphadenopathy.  Continue silodosin 8 mg daily, cialis 5 mg daily and Myrbetriq 50 mg daily   He saw CNP Weisent in 1/25 (rad onc)      Vitamin D def:   Continue OTC Vitamin D3 5000 UT daily      Colon 9/24 fragments of TA and Q 3      Ophth:  Sees yearly. He wears reading glasses only. His father had glaucoma.  He has no history of glaucoma or MD. He will have her next eye exam faxed to my office in order to update his medical records.      I spent 15 min with the patient discussing their wishes for end of life choices.   We discussed the need for a Living Will and that wishes should be discussed with Family. The DNR status was reviewed, and we discussed the options of this and, the DNR _CC options as well.   We also went over how important it was to have these choices written down and clear for any surviving family so that their wishes are followed   The patient and I came to to following agreement :   Copy of his Advanced Directives scanned in his chart 3/2022     Hep C normal 10/20   Hep B x 3 done  Flu 10/20, 10/21, 10/22, 10/23, 10/24   TDAP 2015, will update with injury   RSV discussed    Prevnar 11/2020  Pneumo 11/2021   Zostavax never   Shingrix recommended and will check local pharmacies   SARS-CoV-2- 1/21, 2/21, 10/21, 5/22, 8/23, 1/24, 9/24       Some elements in the chart were copied from Dr. Alvarez's last office visit with patient. Notes have been updated where appropriate, and reflect my current medical decision making from today.        RTC in 1 year for MCR or sooner if needed   (MCR due 3/26, last mcr 3/26/25)      Scribe Attestation  By signing my name below, I, Teri Geronimo , Scribe   attest that this documentation has been prepared under the direction and in the presence of Temi Alvarez MD.

## 2025-03-26 ENCOUNTER — APPOINTMENT (OUTPATIENT)
Dept: PRIMARY CARE | Facility: CLINIC | Age: 70
End: 2025-03-26
Payer: MEDICARE

## 2025-03-26 VITALS
BODY MASS INDEX: 25.48 KG/M2 | WEIGHT: 178 LBS | OXYGEN SATURATION: 97 % | DIASTOLIC BLOOD PRESSURE: 60 MMHG | SYSTOLIC BLOOD PRESSURE: 110 MMHG | HEART RATE: 61 BPM | HEIGHT: 70 IN

## 2025-03-26 DIAGNOSIS — E78.00 ELEVATED LDL CHOLESTEROL LEVEL: ICD-10-CM

## 2025-03-26 DIAGNOSIS — Z00.00 ROUTINE GENERAL MEDICAL EXAMINATION AT HEALTH CARE FACILITY: ICD-10-CM

## 2025-03-26 DIAGNOSIS — Z13.220 LIPID SCREENING: ICD-10-CM

## 2025-03-26 DIAGNOSIS — C61 PROSTATE CANCER (MULTI): ICD-10-CM

## 2025-03-26 DIAGNOSIS — H93.13 TINNITUS OF BOTH EARS: ICD-10-CM

## 2025-03-26 DIAGNOSIS — Z00.00 HEALTHCARE MAINTENANCE: Primary | ICD-10-CM

## 2025-03-26 PROCEDURE — 1170F FXNL STATUS ASSESSED: CPT | Performed by: INTERNAL MEDICINE

## 2025-03-26 PROCEDURE — 99214 OFFICE O/P EST MOD 30 MIN: CPT | Performed by: INTERNAL MEDICINE

## 2025-03-26 PROCEDURE — 1157F ADVNC CARE PLAN IN RCRD: CPT | Performed by: INTERNAL MEDICINE

## 2025-03-26 PROCEDURE — 3008F BODY MASS INDEX DOCD: CPT | Performed by: INTERNAL MEDICINE

## 2025-03-26 PROCEDURE — 99397 PER PM REEVAL EST PAT 65+ YR: CPT | Performed by: INTERNAL MEDICINE

## 2025-03-26 PROCEDURE — 1036F TOBACCO NON-USER: CPT | Performed by: INTERNAL MEDICINE

## 2025-03-26 PROCEDURE — 1160F RVW MEDS BY RX/DR IN RCRD: CPT | Performed by: INTERNAL MEDICINE

## 2025-03-26 PROCEDURE — 1123F ACP DISCUSS/DSCN MKR DOCD: CPT | Performed by: INTERNAL MEDICINE

## 2025-03-26 PROCEDURE — 1159F MED LIST DOCD IN RCRD: CPT | Performed by: INTERNAL MEDICINE

## 2025-03-26 PROCEDURE — 1158F ADVNC CARE PLAN TLK DOCD: CPT | Performed by: INTERNAL MEDICINE

## 2025-03-26 PROCEDURE — G0444 DEPRESSION SCREEN ANNUAL: HCPCS | Performed by: INTERNAL MEDICINE

## 2025-03-26 PROCEDURE — G0439 PPPS, SUBSEQ VISIT: HCPCS | Performed by: INTERNAL MEDICINE

## 2025-03-26 ASSESSMENT — ACTIVITIES OF DAILY LIVING (ADL)
GROCERY_SHOPPING: INDEPENDENT
DRESSING: INDEPENDENT
TAKING_MEDICATION: INDEPENDENT
MANAGING_FINANCES: INDEPENDENT
BATHING: INDEPENDENT
DOING_HOUSEWORK: INDEPENDENT

## 2025-03-26 ASSESSMENT — ENCOUNTER SYMPTOMS
DEPRESSION: 0
OCCASIONAL FEELINGS OF UNSTEADINESS: 0
LOSS OF SENSATION IN FEET: 0

## 2025-03-26 ASSESSMENT — PATIENT HEALTH QUESTIONNAIRE - PHQ9
1. LITTLE INTEREST OR PLEASURE IN DOING THINGS: NOT AT ALL
SUM OF ALL RESPONSES TO PHQ9 QUESTIONS 1 AND 2: 0
2. FEELING DOWN, DEPRESSED OR HOPELESS: NOT AT ALL

## 2025-03-26 NOTE — PROGRESS NOTES
"Subjective   Reason for Visit: Sameer Emerson is an 69 y.o. male here for a Medicare Wellness visit.     Past Medical, Surgical, and Family History reviewed and updated in chart.    Reviewed all medications by prescribing practitioner or clinical pharmacist (such as prescriptions, OTCs, herbal therapies and supplements) and documented in the medical record.    HPI    Patient Care Team:  Temi Alvarez MD as PCP - [facility]  Temi Alvarez MD as PCP - [employer] PCP     Review of Systems    Objective   Vitals:  /60 (BP Location: Left arm, Patient Position: Sitting, BP Cuff Size: Adult)   Pulse 61   Ht 1.778 m (5' 10\")   Wt 80.7 kg (178 lb)   SpO2 97%   BMI 25.54 kg/m²       Physical Exam    Assessment & Plan  Lipid screening         Healthcare maintenance         Elevated LDL cholesterol level    Orders:  •  CBC; Future  •  Comprehensive Metabolic Panel; Future  •  Lipid Panel; Future  •  TSH with reflex to Free T4 if abnormal; Future    Routine general medical examination at health care facility    Orders:  •  1 Year Follow Up In [facility] - PCP - Wellness Exam; Future              "

## 2025-03-26 NOTE — ASSESSMENT & PLAN NOTE
Orders:  •  CBC; Future  •  Comprehensive Metabolic Panel; Future  •  Lipid Panel; Future  •  TSH with reflex to Free T4 if abnormal; Future

## 2025-03-27 LAB
ALBUMIN SERPL-MCNC: 4.3 G/DL (ref 3.6–5.1)
ALP SERPL-CCNC: 85 U/L (ref 35–144)
ALT SERPL-CCNC: 15 U/L (ref 9–46)
ANION GAP SERPL CALCULATED.4IONS-SCNC: 6 MMOL/L (CALC) (ref 7–17)
AST SERPL-CCNC: 18 U/L (ref 10–35)
BILIRUB SERPL-MCNC: 1.4 MG/DL (ref 0.2–1.2)
BUN SERPL-MCNC: 16 MG/DL (ref 7–25)
CALCIUM SERPL-MCNC: 9.8 MG/DL (ref 8.6–10.3)
CHLORIDE SERPL-SCNC: 101 MMOL/L (ref 98–110)
CHOLEST SERPL-MCNC: 201 MG/DL
CHOLEST/HDLC SERPL: 3.5 (CALC)
CO2 SERPL-SCNC: 31 MMOL/L (ref 20–32)
CREAT SERPL-MCNC: 1.13 MG/DL (ref 0.7–1.28)
EGFRCR SERPLBLD CKD-EPI 2021: 70 ML/MIN/1.73M2
ERYTHROCYTE [DISTWIDTH] IN BLOOD BY AUTOMATED COUNT: 12.7 % (ref 11–15)
GLUCOSE SERPL-MCNC: 89 MG/DL (ref 65–99)
HCT VFR BLD AUTO: 45.7 % (ref 38.5–50)
HDLC SERPL-MCNC: 57 MG/DL
HGB BLD-MCNC: 15.5 G/DL (ref 13.2–17.1)
LDLC SERPL CALC-MCNC: 127 MG/DL (CALC)
MCH RBC QN AUTO: 31.5 PG (ref 27–33)
MCHC RBC AUTO-ENTMCNC: 33.9 G/DL (ref 32–36)
MCV RBC AUTO: 92.9 FL (ref 80–100)
NONHDLC SERPL-MCNC: 144 MG/DL (CALC)
PLATELET # BLD AUTO: 156 THOUSAND/UL (ref 140–400)
PMV BLD REES-ECKER: 11.1 FL (ref 7.5–12.5)
POTASSIUM SERPL-SCNC: 4.8 MMOL/L (ref 3.5–5.3)
PROT SERPL-MCNC: 6.7 G/DL (ref 6.1–8.1)
RBC # BLD AUTO: 4.92 MILLION/UL (ref 4.2–5.8)
SODIUM SERPL-SCNC: 138 MMOL/L (ref 135–146)
TRIGL SERPL-MCNC: 75 MG/DL
TSH SERPL-ACNC: 2.08 MIU/L (ref 0.4–4.5)
WBC # BLD AUTO: 6.4 THOUSAND/UL (ref 3.8–10.8)

## 2025-03-27 NOTE — RESULT ENCOUNTER NOTE
Romaine Estrella-  The bilirubin  is up with fasting for you and this is common with Gilbert and is benign   The cholesterol went up a little and goal is still to get the LDL <100 so have to work on diet and exercise summer   Rest of the labs are good range

## 2025-04-21 NOTE — PROGRESS NOTES
Patient ID: Sameer Emerson is a 69 y.o. male who presents for ear evaluation and for earwax removal.     PROVIDER IMPRESSIONS:  DIAGNOSES/PROBLEMS:  - Cerumen impaction of both ears   - a plugged sensation in the left ear   -Left acute otitis externa    ASSESSMENT: Sameer Emerson is a 69 y.o. male who presents for an initial encounter with symptoms and clinical findings that are consistent with bilateral cerumen impaction and left AOE. Using appropriate instrumentation, impacted cerumen was successfully removed from the EAC bilaterally. Otologic exam today under microscopy revealed mild diffuse erythema in the left EAC. There was no evidence of EAC infection/inflammation on the right and no evidence of infection, effusion, retraction or perforation of the TM bilaterally.    PLAN:   -I counseled patient on safe interventions for cerumen management at home. Patient may wash the external ear with a cloth. I reinforced education to the patient that they should avoid using cotton tipped applicators, tissues, paper, or any rigid object in the ear canal. I explained to the patient that doing so may cause wax to be pushed back into the ear canal and stuck and also risks injury to the ear canal and ear drum.   - I recommended that patient initiates course of Ciprodex otic drops with 4 drops into the left EAC BID x 7 days. Patient was counseled on the indications, possible side effects, and proper administration of medication. Prescription was e-submitted to the patient's preferred pharmacy.   -Follow-up: Patient may schedule for routine evaluation for the removal of cerumen impaction as needed. Patient is agreeable to plan. All questions answered to patient satisfaction.    Subjective    HPI: Sameer Emerson is a 69 y.o. male who presents for an initial evaluation for the ears and earwax removal. Patient presents today with a plugged sensation in the left ear. States that symptoms began 3 days ago after swimming.  Patient reports current symptoms of hearing difficulty. Denies current symptoms of tinnitus, ear pain, ear drainage, ear itching, aural fullness, autophony, dizziness or vertigo. Patient has been using ceruminolytic agents o.t.c. debrox to loosen wax in the left ear canal immediately prior to this visit with no symptom benefit. Patient denies a past medical history of recurrent ear infections. Patient denies any prior history of ear surgery. Patient denies history of PE tube insertion. Patient denies history of prolonged/traumatic loud noise exposure.     REVIEW OF SYSTEMS:  All other systems negative.    Objective   Physical Exam:  Right Ear: External inspection of ear with no deformity, scars, or masses. EAC is completely impacted with cerumen. Unable to visualize tympanic membrane.  Left Ear: External inspection of ear with no deformity, scars, or masses. EAC is completely impacted with cerumen. Unable to visualize tympanic membrane.     Nose: External inspection of nose: No nasal lesions, lacerations or scars.   Neurologic: Cranial nerves II-XII grossly intact and symmetric bilaterally.  Head and Face:  Head: Atraumatic with no masses, lesions or scarring.  Face: Normal symmetry. No scars or deformities.  Eyes: Conjunctiva not edematous or erythematous.   Neck: Normal appearing, symmetric, trachea midline.   Cardiovascular: Examination of peripheral vascular system shows no clubbing or cyanosis.  Respiratory: No respiratory distress increased work of breathing. Inspection of the chest with symmetric chest expansion and normal respiratory effort.  Skin: No head and neck rashes.    EAR CLEANING PROCEDURE NOTE:  Indication: Cerumen impaction  Location: bilateral ear canal(s)  Procedure Note: The procedure was performed by the provider.  Visualization Instrument: A microscope with a #5 speculum was placed in the ear canals to visualize the ear canal debris.  Ear Cleaning Instrument and Outcome: Using the andrew  suction, a large amount of soft, yellow cerumen was removed from the impacted EAC(s).  Patient Status: The patient tolerated the procedure well.  Complications: There were no complications.  Post-Procedure/Microscopic Otologic Exam:  Right Ear--EAC is clear. TM is intact with no sign of infection, effusion, or retraction.  No perforation seen. Auto insufflation visible under microscopy.  Left Ear-- EAC is clear and appears with mild erythema. TM is intact with no sign of infection, effusion, or retraction.  No perforation seen. Auto insufflation visible under microscopy.

## 2025-04-22 ENCOUNTER — OFFICE VISIT (OUTPATIENT)
Facility: CLINIC | Age: 70
End: 2025-04-22
Payer: MEDICARE

## 2025-04-22 VITALS
HEART RATE: 60 BPM | OXYGEN SATURATION: 98 % | SYSTOLIC BLOOD PRESSURE: 116 MMHG | BODY MASS INDEX: 24.91 KG/M2 | HEIGHT: 70 IN | WEIGHT: 174 LBS | DIASTOLIC BLOOD PRESSURE: 76 MMHG

## 2025-04-22 DIAGNOSIS — H61.23 BILATERAL IMPACTED CERUMEN: ICD-10-CM

## 2025-04-22 DIAGNOSIS — H93.8X2 SENSATION OF PLUGGED EAR ON LEFT SIDE: ICD-10-CM

## 2025-04-22 DIAGNOSIS — H60.502 ACUTE OTITIS EXTERNA OF LEFT EAR, UNSPECIFIED TYPE: Primary | ICD-10-CM

## 2025-04-22 PROCEDURE — 1036F TOBACCO NON-USER: CPT

## 2025-04-22 PROCEDURE — 1159F MED LIST DOCD IN RCRD: CPT

## 2025-04-22 PROCEDURE — 1123F ACP DISCUSS/DSCN MKR DOCD: CPT

## 2025-04-22 PROCEDURE — 69210 REMOVE IMPACTED EAR WAX UNI: CPT

## 2025-04-22 PROCEDURE — 1160F RVW MEDS BY RX/DR IN RCRD: CPT

## 2025-04-22 PROCEDURE — 3008F BODY MASS INDEX DOCD: CPT

## 2025-04-22 PROCEDURE — 1157F ADVNC CARE PLAN IN RCRD: CPT

## 2025-04-22 PROCEDURE — 99203 OFFICE O/P NEW LOW 30 MIN: CPT

## 2025-04-22 RX ORDER — CIPROFLOXACIN AND DEXAMETHASONE 3; 1 MG/ML; MG/ML
4 SUSPENSION/ DROPS AURICULAR (OTIC) 2 TIMES DAILY
Qty: 7.5 ML | Refills: 0 | Status: SHIPPED | OUTPATIENT
Start: 2025-04-22 | End: 2025-04-29

## 2025-04-22 ASSESSMENT — ENCOUNTER SYMPTOMS
LOSS OF SENSATION IN FEET: 0
DEPRESSION: 0
OCCASIONAL FEELINGS OF UNSTEADINESS: 0

## 2025-06-02 DIAGNOSIS — R39.12 WEAK URINARY STREAM: ICD-10-CM

## 2025-06-02 RX ORDER — SILODOSIN 8 MG/1
8 CAPSULE ORAL
Qty: 90 CAPSULE | Refills: 1 | Status: SHIPPED | OUTPATIENT
Start: 2025-06-02 | End: 2025-11-29

## 2025-06-19 ENCOUNTER — APPOINTMENT (OUTPATIENT)
Dept: RADIOLOGY | Facility: CLINIC | Age: 70
End: 2025-06-19
Payer: MEDICARE

## 2025-06-19 DIAGNOSIS — C61 PROSTATE CANCER (MULTI): ICD-10-CM

## 2025-06-19 DIAGNOSIS — R91.8 LUNG NODULES: ICD-10-CM

## 2025-06-19 PROCEDURE — 71250 CT THORAX DX C-: CPT

## 2025-06-19 PROCEDURE — 71250 CT THORAX DX C-: CPT | Performed by: RADIOLOGY

## 2025-06-21 DIAGNOSIS — R91.1 SOLID NODULE OF LUNG GREATER THAN 8 MM IN DIAMETER: ICD-10-CM

## 2025-06-21 DIAGNOSIS — C78.02 SECONDARY MALIGNANT NEOPLASM OF LEFT LUNG (MULTI): ICD-10-CM

## 2025-06-21 DIAGNOSIS — R91.1 LEFT LOWER LOBE PULMONARY NODULE: Primary | ICD-10-CM

## 2025-07-01 ENCOUNTER — LAB (OUTPATIENT)
Dept: LAB | Facility: HOSPITAL | Age: 70
End: 2025-07-01
Payer: MEDICARE

## 2025-07-01 DIAGNOSIS — C61 MALIGNANT NEOPLASM OF PROSTATE (MULTI): Primary | ICD-10-CM

## 2025-07-01 LAB — PSA SERPL-MCNC: <0.1 NG/ML

## 2025-07-01 PROCEDURE — 36415 COLL VENOUS BLD VENIPUNCTURE: CPT

## 2025-07-01 PROCEDURE — 84153 ASSAY OF PSA TOTAL: CPT

## 2025-07-02 NOTE — PROGRESS NOTES
Department of Medicine  Division of Pulmonary, Critical Care, and Sleep Medicine  Consultation  [facility] - [address], [address]    I was asked by Temi Alvarez MD, to evaluate Mr. Sameer Emerson for lung nodule. I have independently interviewed and examined the patient in the office and reviewed available records.    Physician HPI:    The patient is a 69-year-old woman who presents today to establish care in our pulmonology office referred by his primary care physician for the management of lung nodule.  The patient has been undergoing surveillance CT scans which demonstrated a left lower lobe lung nodule.  It appears groundglass in etiology.  It is subsolid.  There has been some degree of progression and this was followed up with a PET/CT.  The PET/CT shows no hypermetabolic uptake of the lung nodule.  The patient is a never smoker.  He has a personal history of Prostate Cancer --> valery 7 and underwent SBRT.  He has no respiratory complaints.  He works as a podiatrist.  Placement into the [facility] solitary pulmonary nodule malignancy risk for Later shows a risk of 1.8% after all the studies.    The patient father had a history of nontuberculous mycobacterial.  Again he has no respiratory complaints.        Review of Systems   All other review of systems are negative and/or non-contributory.      Immunizations:  Immunization History   Administered Date(s) Administered    Flu vaccine, quadrivalent, high-dose, preservative free, age 65y+ (FLUZONE) 10/14/2021, 10/18/2023    Flu vaccine, trivalent, preservative free, HIGH-DOSE, age 65y+ (Fluzone) 09/24/2019, 10/15/2020, 10/12/2022, 10/03/2024    Influenza, seasonal, injectable 10/12/2022    Moderna COVID-19 vaccine, 12 years and older (50mcg/0.5mL)(Spikevax) 01/12/2024, 09/11/2024    Moderna COVID-19 vaccine, bivalent, blue cap/gray label *Check age/dose* 09/23/2022, 08/16/2023    Moderna SARS-CoV-2 Vaccination 01/15/2021, 02/13/2021, 10/29/2021,  "05/16/2022    Novel influenza-H1N1-09, preservative-free 11/20/2009    Pneumococcal conjugate vaccine, 13-valent (PREVNAR 13) 11/04/2020    Pneumococcal polysaccharide vaccine, 23-valent, age 2 years and older (PNEUMOVAX 23) 11/18/2021       Past Medical History:  Medical History[1]    Past Surgical History:  Surgical History[2]    Family History:  Family History[3]    Social History:  Social History[4]     Occupational & Environmental History:   Podiatrist     Medications:  Current Outpatient Medications   Medication Instructions    silodosin (RAPAFLO) 8 mg, oral, Daily with breakfast    tadalafil (CIALIS) 5 mg, oral, Daily        Drug Allergies/Intolerances:  RX Allergies[5]         Visit Vitals  Smoking Status Never        Physical Exam     Pulmonary Function Test Results       Chest Radiograph     No results found for this or any previous visit from the past 2000 days.      Echocardiogram     No results found for this or any previous visit from the past 365 days.       Chest CT Scan     No results found for this or any previous visit from the past 365 days.       Laboratory Studies     Lab Results   Component Value Date    WBC 6.4 03/26/2025    HGB 15.5 03/26/2025    HCT 45.7 03/26/2025    MCV 92.9 03/26/2025     03/26/2025      Lab Results   Component Value Date    GLUCOSE 89 03/26/2025    CALCIUM 9.8 03/26/2025     03/26/2025    K 4.8 03/26/2025    CO2 31 03/26/2025     03/26/2025    BUN 16 03/26/2025    CREATININE 1.13 03/26/2025      Lab Results   Component Value Date    ALT 15 03/26/2025    AST 18 03/26/2025    ALKPHOS 85 03/26/2025    BILITOT 1.4 (H) 03/26/2025        No results found for: \"PROTIME\", \"INR\", \"PTT\"    No results found for: \"ICIGE\", \"IGE\", \"ICA04\", \"ASPFU\", \"IGG\", \"IGA\", \"IGM\"    Sputum Culture     No results found for: \"AFBCX\"   No results found for: \"RESPCULTCYFI\"    No results found for the last 90 days.          Assessment and Plan / Recommendations     Problem List and " Orders  Problem List Items Addressed This Visit    None  Visit Diagnoses         Codes      Solitary pulmonary nodule    -  Primary R91.1            Assessment and Plan / Recommendations:  Problem List Items Addressed This Visit    None   -The patient can resume surveillance CT every year  - The nodule appears groundglass and can indicate slow-growing adenocarcinoma in situ  - This may not always be PET positive   - Will follow-up in a year with updated CT scan    Kendrick Rivers DO  07/28/2025           [1]   Past Medical History:  Diagnosis Date    Lung nodule June 2023    Personal history of irradiation December 2021    Prostate cancer (Multi) Sept. 2021    Tinnitus    [2]   Past Surgical History:  Procedure Laterality Date    WISDOM TOOTH EXTRACTION  1986   [3]   Family History  Problem Relation Name Age of Onset    Alzheimer's disease Mother      Glaucoma Father Demario     Pneumonia Father Demario     Cancer Father Demario     Diabetes Brother Gatito     Lung disease Father Demario 70 - 79   [4]   Social History  Tobacco Use    Smoking status: Never    Smokeless tobacco: Never   Vaping Use    Vaping status: Never Used   Substance Use Topics    Alcohol use: Not Currently    Drug use: Never   [5] No Known Allergies

## 2025-07-07 ENCOUNTER — HOSPITAL ENCOUNTER (OUTPATIENT)
Dept: RADIOLOGY | Facility: CLINIC | Age: 70
Discharge: HOME | End: 2025-07-07
Payer: MEDICARE

## 2025-07-07 DIAGNOSIS — C78.02 SECONDARY MALIGNANT NEOPLASM OF LEFT LUNG (MULTI): ICD-10-CM

## 2025-07-07 DIAGNOSIS — R91.1 SOLID NODULE OF LUNG GREATER THAN 8 MM IN DIAMETER: ICD-10-CM

## 2025-07-07 DIAGNOSIS — R91.1 LEFT LOWER LOBE PULMONARY NODULE: ICD-10-CM

## 2025-07-07 PROCEDURE — A9552 F18 FDG: HCPCS | Performed by: INTERNAL MEDICINE

## 2025-07-07 PROCEDURE — 78815 PET IMAGE W/CT SKULL-THIGH: CPT | Mod: PI

## 2025-07-07 PROCEDURE — 3430000001 HC RX 343 DIAGNOSTIC RADIOPHARMACEUTICALS: Performed by: INTERNAL MEDICINE

## 2025-07-07 PROCEDURE — 78815 PET IMAGE W/CT SKULL-THIGH: CPT | Mod: PET TUMOR INIT TX STRAT | Performed by: INTERNAL MEDICINE

## 2025-07-07 RX ORDER — FLUDEOXYGLUCOSE F 18 200 MCI/ML
11 INJECTION, SOLUTION INTRAVENOUS
Status: COMPLETED | OUTPATIENT
Start: 2025-07-07 | End: 2025-07-07

## 2025-07-07 RX ADMIN — FLUDEOXYGLUCOSE F 18 11 MILLICURIE: 200 INJECTION, SOLUTION INTRAVENOUS at 12:35

## 2025-07-11 ENCOUNTER — TELEPHONE (OUTPATIENT)
Dept: RADIATION ONCOLOGY | Facility: HOSPITAL | Age: 70
End: 2025-07-11
Payer: MEDICARE

## 2025-07-11 ASSESSMENT — ENCOUNTER SYMPTOMS
DIARRHEA: 0
SHORTNESS OF BREATH: 0
BACK PAIN: 0
FREQUENCY: 0
DIZZINESS: 0
RECTAL PAIN: 0
CONSTIPATION: 0
WEAKNESS: 0
PALPITATIONS: 0
FEVER: 0
UNEXPECTED WEIGHT CHANGE: 0
ABDOMINAL PAIN: 0
PSYCHIATRIC NEGATIVE: 1
JOINT SWELLING: 0
COUGH: 0
FATIGUE: 0
ARTHRALGIAS: 0
DIFFICULTY URINATING: 0
ANAL BLEEDING: 1
CHEST TIGHTNESS: 0
BLOOD IN STOOL: 1
DYSURIA: 0
HEMATURIA: 0
ALLERGIC/IMMUNOLOGIC NEGATIVE: 1
ROS GI COMMENTS: REFER TO HPI

## 2025-07-11 NOTE — TELEPHONE ENCOUNTER
Called pt. to remind of appointment on 7/14/2025 at  1:30 pm with KINGA Greer. Pt's phone went to voicemail left number if needs to reschedule.

## 2025-07-11 NOTE — PROGRESS NOTES
Cancer synopsis:  Rad/onc: Dr. Guy/ [facility]    Elevated PSA at 4.76 on 1/9/21, which increased to 5.8 on 6/27/21.      Prostate MRI on 8/7/21 showed:  1.  1.5 cm right posteromedial peripheral zone lesion extending from the base to the mid gland and 1.1 cm left posteromedial peripheral zone lesion in the mid gland the consistent with  PI-RADS 5 and 4  lesions respectively. Mild capsular irregularity associated with both these lesions concerning for extracapsular extension. Abutment of the medial aspect of the right seminal vesicle by the right base  peripheral zone lesion concerning for seminal vesicle invasion.  2. No pelvic lymphadenopathy.     The prostate was estimated to weigh 24.2g.     Prostate biopsy on 8/26/21 GG2     SBRT completed on 12/17/21.     Completed course of Orgovyx.     Recent imaging:  none    History of presenting illness:    Patient ID: [id number]     Sameer Emerson is a 69 y.o. male who presents unfavorable  intermediate risk prostate cancer, [id number], Luly 3+4=7 (12/12 involved cores), iPSA 5.8 now s/p RT 12/17/2021    RT Site: Prostate   RT Date: 12/08/2021  Hormone therapy: Yes, st-term orgovyx with testerone recovery  Hot Flushes: Denies  Fatigue: Denies  Bone pain: Denies  FLYNN: n/a virtual  ED: Does report some changes in sexual function. Has not used PDE5i in the past.  ED medications: No  IPSS: n/a virtual  Urinary symptoms: Denies reports hematuria, frequency or urgency, urine leakage.  Urinary Medications: Yes, silodosin 8 mg, Cialis 5 mg and mybrtriq 50mg  Rectal bleeding: Reports rectal bleeding about once a week. Sometimes can be 2-3 days a in a row. Recently had hemmoird banding. Recent colonoscopy did not downstrate anything large enough to tx per GI.  Other systems: Denies SOB, CP or fever. Does report some muscle loss and weight gain since hormone therapy.    Review of systems:  Review of Systems   Constitutional:  Negative for fatigue, fever and unexpected  weight change.   Respiratory:  Negative for cough, chest tightness and shortness of breath.    Cardiovascular:  Negative for chest pain, palpitations and leg swelling.   Gastrointestinal:  Positive for anal bleeding and blood in stool. Negative for abdominal pain, constipation, diarrhea and rectal pain.        Refer to HPI   Endocrine: Negative for cold intolerance, heat intolerance and polyuria.   Genitourinary:  Negative for decreased urine volume, difficulty urinating, dysuria, frequency, hematuria and urgency.        Refer to HPI   Musculoskeletal:  Negative for arthralgias, back pain, gait problem and joint swelling.   Skin: Negative.    Allergic/Immunologic: Negative.    Neurological:  Negative for dizziness, syncope and weakness.   Psychiatric/Behavioral: Negative.       Past Medical history  Past Medical History:   Diagnosis Date    Personal history of irradiation December 2021    Prostate cancer (Multi) Sept. 2021    Tinnitus         Surgical/family history  Family History   Problem Relation Name Age of Onset    Alzheimer's disease Mother      Glaucoma Father Demario     Pneumonia Father Demario     Cancer Father Demario     Diabetes Brother Gatito       Past Surgical History:   Procedure Laterality Date    WISDOM TOOTH EXTRACTION  1986        Social History  Tobacco Use: Low Risk  (4/22/2025)    Patient History     Smoking Tobacco Use: Never     Smokeless Tobacco Use: Never     Passive Exposure: Not on file       Current med list:  Current Outpatient Medications   Medication Instructions    mirabegron (MYRBETRIQ) 50 mg, oral, Daily    silodosin (RAPAFLO) 8 mg, oral, Daily with breakfast    tadalafil (CIALIS) 5 mg, oral, Daily      Last recorded vital:  There were no vitals taken for this visit.    Physical exam  N/a virtual    Pertinent labs:  Prostate Specific AG   Date/Time Value Ref Range Status   07/01/2025 09:16 AM <0.10 <=4.00 ng/mL Final     Dx:  Problem List Items Addressed This Visit    None  Visit  Diagnoses         Malignant neoplasm of prostate (Multi)    -  Primary    Relevant Orders    Clinic Appointment Request Follow Up; [facility] (virtual); [facility] (virtual) (Completed)        PSA of <0.10 was reviewed and is undectable. Review of latent SE including rectal bleeding, hematuria, urinary strictures, ED where reviewed as well as how to contact office if s/s present. Does report occasional rectal bleeding.  Reports some mild decline in erectile function. Denies other latent SE.     Rectal bleeding:  Discussed ongoing rectal bleeding and the onset of radiation proctitis. Reviewed treatment measures including conservative monitoring, rectal enemas including proctofoam and sucralfate as well as in refractory cases the usage of homian laser or HBO. At this time patient is going to have a colonoscopy performed and killian let rad/onc know when done. Will assess at this time for enema need or not.    LUTS:  Reviewed daily tadalafil and silodsin, and mybretiqe patient will continue at this time. Refills sent    Lung mass:  Follow up imaging in 1yr. FDG pet showed no uptake    PLAN:  FUV 6m virtually  Labs Psa in 6m  Imaging none  Silodosin daily  Cialis 5mg daily  Mybretriq daily   FUV other providers: PCP for routine evals    Please contact office with any concerns:  Domo Fink CNP  [phone]

## 2025-07-14 ENCOUNTER — HOSPITAL ENCOUNTER (OUTPATIENT)
Dept: RADIATION ONCOLOGY | Facility: HOSPITAL | Age: 70
Setting detail: RADIATION/ONCOLOGY SERIES
Discharge: HOME | End: 2025-07-14
Payer: MEDICARE

## 2025-07-14 DIAGNOSIS — C61 MALIGNANT NEOPLASM OF PROSTATE (MULTI): Primary | ICD-10-CM

## 2025-07-14 PROCEDURE — 99213 OFFICE O/P EST LOW 20 MIN: CPT

## 2025-07-14 PROCEDURE — 99213 OFFICE O/P EST LOW 20 MIN: CPT | Mod: 95

## 2025-07-28 ENCOUNTER — APPOINTMENT (OUTPATIENT)
Facility: CLINIC | Age: 70
End: 2025-07-28
Payer: MEDICARE

## 2025-07-28 VITALS
OXYGEN SATURATION: 98 % | HEART RATE: 62 BPM | HEIGHT: 70 IN | BODY MASS INDEX: 25.77 KG/M2 | RESPIRATION RATE: 18 BRPM | TEMPERATURE: 97.9 F | DIASTOLIC BLOOD PRESSURE: 72 MMHG | SYSTOLIC BLOOD PRESSURE: 108 MMHG | WEIGHT: 180 LBS

## 2025-07-28 DIAGNOSIS — R91.1 SOLITARY PULMONARY NODULE: Primary | ICD-10-CM

## 2025-07-28 PROCEDURE — 1159F MED LIST DOCD IN RCRD: CPT | Performed by: INTERNAL MEDICINE

## 2025-07-28 PROCEDURE — 3008F BODY MASS INDEX DOCD: CPT | Performed by: INTERNAL MEDICINE

## 2025-07-28 PROCEDURE — 1036F TOBACCO NON-USER: CPT | Performed by: INTERNAL MEDICINE

## 2025-07-28 PROCEDURE — 99203 OFFICE O/P NEW LOW 30 MIN: CPT | Performed by: INTERNAL MEDICINE

## 2025-07-28 ASSESSMENT — PATIENT HEALTH QUESTIONNAIRE - PHQ9
2. FEELING DOWN, DEPRESSED OR HOPELESS: NOT AT ALL
1. LITTLE INTEREST OR PLEASURE IN DOING THINGS: NOT AT ALL
SUM OF ALL RESPONSES TO PHQ9 QUESTIONS 1 AND 2: 0

## 2025-09-02 DIAGNOSIS — R35.0 BENIGN PROSTATIC HYPERPLASIA WITH URINARY FREQUENCY: ICD-10-CM

## 2025-09-02 DIAGNOSIS — N40.1 BENIGN PROSTATIC HYPERPLASIA WITH URINARY FREQUENCY: ICD-10-CM

## 2025-09-02 RX ORDER — TADALAFIL 5 MG/1
5 TABLET ORAL DAILY
Qty: 90 TABLET | Refills: 1 | Status: SHIPPED | OUTPATIENT
Start: 2025-09-02 | End: 2026-03-01

## 2026-03-26 ENCOUNTER — APPOINTMENT (OUTPATIENT)
Dept: PRIMARY CARE | Facility: CLINIC | Age: 71
End: 2026-03-26
Payer: MEDICARE

## 2026-04-01 ENCOUNTER — APPOINTMENT (OUTPATIENT)
Dept: PRIMARY CARE | Facility: CLINIC | Age: 71
End: 2026-04-01
Payer: MEDICARE

## 2026-07-07 ENCOUNTER — APPOINTMENT (OUTPATIENT)
Facility: CLINIC | Age: 71
End: 2026-07-07
Payer: MEDICARE